# Patient Record
Sex: MALE | Race: WHITE | NOT HISPANIC OR LATINO | Employment: OTHER | URBAN - METROPOLITAN AREA
[De-identification: names, ages, dates, MRNs, and addresses within clinical notes are randomized per-mention and may not be internally consistent; named-entity substitution may affect disease eponyms.]

---

## 2018-12-24 RX ORDER — PANTOPRAZOLE SODIUM 40 MG/1
40 TABLET, DELAYED RELEASE ORAL
Qty: 90 TABLET | Refills: 0 | Status: SHIPPED | OUTPATIENT
Start: 2018-12-24 | End: 2019-03-25 | Stop reason: SDUPTHER

## 2018-12-24 RX ORDER — PANTOPRAZOLE SODIUM 40 MG/1
40 TABLET, DELAYED RELEASE ORAL
Qty: 90 TABLET | Refills: 0 | OUTPATIENT
Start: 2018-12-24

## 2019-01-07 ENCOUNTER — OFFICE VISIT (OUTPATIENT)
Dept: INTERNAL MEDICINE | Facility: CLINIC | Age: 74
End: 2019-01-07
Payer: COMMERCIAL

## 2019-01-07 VITALS
OXYGEN SATURATION: 92 % | HEIGHT: 72 IN | HEART RATE: 83 BPM | BODY MASS INDEX: 26.55 KG/M2 | TEMPERATURE: 97.7 F | RESPIRATION RATE: 17 BRPM | DIASTOLIC BLOOD PRESSURE: 70 MMHG | SYSTOLIC BLOOD PRESSURE: 120 MMHG | WEIGHT: 196 LBS

## 2019-01-07 DIAGNOSIS — R05.9 COUGH: ICD-10-CM

## 2019-01-07 DIAGNOSIS — J01.90 ACUTE SINUSITIS, RECURRENCE NOT SPECIFIED, UNSPECIFIED LOCATION: Primary | ICD-10-CM

## 2019-01-07 PROCEDURE — 99214 OFFICE O/P EST MOD 30 MIN: CPT | Performed by: NURSE PRACTITIONER

## 2019-01-07 RX ORDER — AMOXICILLIN 500 MG/1
500 TABLET, FILM COATED ORAL 3 TIMES DAILY
Qty: 21 TABLET | Refills: 0 | Status: SHIPPED | OUTPATIENT
Start: 2019-01-07 | End: 2019-01-14

## 2019-01-07 ASSESSMENT — ENCOUNTER SYMPTOMS
JOINT SWELLING: 0
TROUBLE SWALLOWING: 0
NECK STIFFNESS: 0
DIARRHEA: 0
SINUS PRESSURE: 1
VOICE CHANGE: 1
EYE ITCHING: 0
HEMATURIA: 0
BACK PAIN: 0
WHEEZING: 0
FLANK PAIN: 0
CONFUSION: 0
RHINORRHEA: 1
DYSURIA: 0
TREMORS: 0
SWOLLEN GLANDS: 0
BLOOD IN STOOL: 0
SEIZURES: 0
SINUS PAIN: 1
HEADACHES: 0
NUMBNESS: 0
WEAKNESS: 0
SORE THROAT: 1
ABDOMINAL PAIN: 0
DIFFICULTY URINATING: 0
CONSTIPATION: 0
VOMITING: 0
CHEST TIGHTNESS: 0
PALPITATIONS: 0
FREQUENCY: 0
CHILLS: 0
FATIGUE: 1
EYE REDNESS: 0
NAUSEA: 0
DIZZINESS: 0
COUGH: 1
NECK PAIN: 0
SHORTNESS OF BREATH: 0
FEVER: 0
EYE DISCHARGE: 0

## 2019-01-07 NOTE — PROGRESS NOTES
Daily Progress Note      Subjective      Patient ID: Quang Carrera is a 73 y.o. male who presents here for     URI    This is a new problem. Episode onset: one week ago. The problem has been gradually worsening. There has been no fever. Associated symptoms include congestion, coughing, rhinorrhea, sinus pain, sneezing and a sore throat (improving). Pertinent negatives include no abdominal pain, chest pain, diarrhea, dysuria, ear pain, headaches, joint pain, joint swelling, nausea, neck pain, plugged ear sensation, rash, swollen glands, vomiting or wheezing. He has tried nothing for the symptoms.         Review of Systems   Constitutional: Positive for fatigue. Negative for chills and fever.   HENT: Positive for congestion, postnasal drip, rhinorrhea, sinus pain, sinus pressure, sneezing, sore throat (improving) and voice change. Negative for ear pain, tinnitus and trouble swallowing.    Eyes: Negative for discharge, redness and itching.   Respiratory: Positive for cough. Negative for chest tightness, shortness of breath and wheezing.    Cardiovascular: Negative for chest pain, palpitations and leg swelling.   Gastrointestinal: Negative for abdominal pain, blood in stool, constipation, diarrhea, nausea and vomiting.   Endocrine: Negative for cold intolerance and heat intolerance.   Genitourinary: Negative for difficulty urinating, dysuria, flank pain, frequency, hematuria and urgency.   Musculoskeletal: Negative for back pain, joint pain, joint swelling, neck pain and neck stiffness.   Skin: Negative for rash.   Allergic/Immunologic: Negative for environmental allergies.   Neurological: Negative for dizziness, tremors, seizures, weakness, numbness and headaches.   Psychiatric/Behavioral: Negative for confusion.       Current Outpatient Prescriptions   Medication Sig Dispense Refill   • pantoprazole (PROTONIX) 40 mg EC tablet Take 1 tablet (40 mg total) by mouth once daily. 90 tablet 0   • amoxicillin (AMOXIL) 500 mg  tablet Take 1 tablet (500 mg total) by mouth 3 (three) times a day for 7 days. 21 tablet 0     No current facility-administered medications for this visit.      History reviewed. No pertinent past medical history.  History reviewed. No pertinent family history.  History reviewed. No pertinent surgical history.  Social History     Social History   • Marital status:      Spouse name: N/A   • Number of children: N/A   • Years of education: N/A     Occupational History   • Not on file.     Social History Main Topics   • Smoking status: Never Smoker   • Smokeless tobacco: Never Used   • Alcohol use Yes   • Drug use: Unknown   • Sexual activity: Not on file     Other Topics Concern   • Not on file     Social History Narrative   • No narrative on file     No Known Allergies    Objective   Vitals:    01/07/19 1229   BP: 120/70   Pulse: 83   Resp: 17   Temp: 36.5 °C (97.7 °F)   TempSrc: Oral   SpO2: 92%   Weight: 88.9 kg (196 lb)   Height: 1.829 m (6')     Body mass index is 26.58 kg/m².      Physical Exam   Constitutional: He is oriented to person, place, and time. He appears well-developed and well-nourished. He appears ill.   HENT:   Head: Normocephalic.   Right Ear: Tympanic membrane, external ear and ear canal normal.   Left Ear: Tympanic membrane, external ear and ear canal normal.   Nose: Rhinorrhea present. Right sinus exhibits frontal sinus tenderness. Left sinus exhibits maxillary sinus tenderness.   Mouth/Throat: Posterior oropharyngeal erythema present. No oropharyngeal exudate.   Eyes: Conjunctivae and EOM are normal. Pupils are equal, round, and reactive to light.   Neck: Normal range of motion. Neck supple.   Cardiovascular: Normal rate, regular rhythm and normal heart sounds.    Pulmonary/Chest: Effort normal and breath sounds normal.   Abdominal: Soft. Bowel sounds are normal.   Musculoskeletal: Normal range of motion.   Neurological: He is alert and oriented to person, place, and time.   Skin: Skin  is warm and dry.   Psychiatric: He has a normal mood and affect. His behavior is normal.       Assessment/Plan     Problem List Items Addressed This Visit     None      Visit Diagnoses     Acute sinusitis, recurrence not specified, unspecified location    -  Primary    Saline nasal spray  Warm salt water gargles  Push fluids  Call if no improvement      Relevant Medications    amoxicillin (AMOXIL) 500 mg tablet    Cough        Mucinex for your cough  Call if no improvement            MICHELE Flannery  1/7/2019

## 2019-01-07 NOTE — PATIENT INSTRUCTIONS
Mucinex for your cough  Saline nasal spray  Warm salt water gargles  Push fluids  Call if no improvement

## 2019-01-14 ENCOUNTER — OFFICE VISIT (OUTPATIENT)
Dept: INTERNAL MEDICINE | Facility: CLINIC | Age: 74
End: 2019-01-14
Payer: COMMERCIAL

## 2019-01-14 VITALS
RESPIRATION RATE: 18 BRPM | BODY MASS INDEX: 26.57 KG/M2 | SYSTOLIC BLOOD PRESSURE: 130 MMHG | HEART RATE: 95 BPM | HEIGHT: 72 IN | WEIGHT: 196.2 LBS | OXYGEN SATURATION: 96 % | DIASTOLIC BLOOD PRESSURE: 80 MMHG | TEMPERATURE: 98 F

## 2019-01-14 DIAGNOSIS — J01.90 ACUTE SINUSITIS, RECURRENCE NOT SPECIFIED, UNSPECIFIED LOCATION: Primary | ICD-10-CM

## 2019-01-14 PROCEDURE — 99213 OFFICE O/P EST LOW 20 MIN: CPT | Performed by: INTERNAL MEDICINE

## 2019-01-14 ASSESSMENT — ENCOUNTER SYMPTOMS
ABDOMINAL PAIN: 0
CHILLS: 0
HEADACHES: 0
FATIGUE: 0
CONFUSION: 0
SORE THROAT: 0
PALPITATIONS: 0
HEMATURIA: 0
COUGH: 0
EYE PAIN: 0
DYSURIA: 0
BACK PAIN: 0
VOMITING: 0
NUMBNESS: 0
BLOOD IN STOOL: 0
DIZZINESS: 0
DIARRHEA: 0
NAUSEA: 0
BRUISES/BLEEDS EASILY: 0
SHORTNESS OF BREATH: 0
FEVER: 0

## 2019-01-14 NOTE — ASSESSMENT & PLAN NOTE
Much improved  Advised him to use saline nasal spray daily and if symptoms persist to try Flonase nasal spray  He will continue the Mucinex daily for 5 more days follow-up if symptoms recur or are persistent

## 2019-01-14 NOTE — PROGRESS NOTES
Subjective      Patient ID: Quang Carrera is a 73 y.o. male     HPI     Here for follow-up on the acute visit for sinusitis and cough last week  He was given amoxicillin that he completed  He feels a lot better  Has mild postnasal drip and sinus congestion but overall feels much improved    Past Medical History:   Diagnosis Date   • Colon polyps    • Hemangioma of liver    • Hypertension    • Inguinal hernia    • Lipid disorder        Past Surgical History:   Procedure Laterality Date   • EYE SURGERY         No family history on file.    Social History     Social History   • Marital status:      Spouse name: N/A   • Number of children: N/A   • Years of education: N/A     Occupational History   • Not on file.     Social History Main Topics   • Smoking status: Never Smoker   • Smokeless tobacco: Never Used   • Alcohol use Yes   • Drug use: Unknown   • Sexual activity: Not on file     Other Topics Concern   • Not on file     Social History Narrative   • No narrative on file       No Known Allergies    Current Outpatient Prescriptions   Medication Sig Dispense Refill   • pantoprazole (PROTONIX) 40 mg EC tablet Take 1 tablet (40 mg total) by mouth once daily. 90 tablet 0   • amoxicillin (AMOXIL) 500 mg tablet Take 1 tablet (500 mg total) by mouth 3 (three) times a day for 7 days. 21 tablet 0     No current facility-administered medications for this visit.        /80   Pulse 95   Temp 36.7 °C (98 °F) (Oral)   Resp 18   Ht 1.829 m (6')   Wt 89 kg (196 lb 3.2 oz)   SpO2 96%   BMI 26.61 kg/m²       The following have been reviewed and updated as appropriate in this visit:       Review of Systems   Constitutional: Negative for chills, fatigue and fever.   HENT: Positive for postnasal drip. Negative for ear pain and sore throat.    Eyes: Negative for pain and visual disturbance.   Respiratory: Negative for cough and shortness of breath.    Cardiovascular: Negative for chest pain, palpitations and leg  swelling.   Gastrointestinal: Negative for abdominal pain, blood in stool, diarrhea, nausea and vomiting.   Genitourinary: Negative for dysuria and hematuria.   Musculoskeletal: Negative for back pain.   Skin: Negative for rash.   Allergic/Immunologic: Negative for environmental allergies.   Neurological: Negative for dizziness, numbness and headaches.   Hematological: Does not bruise/bleed easily.   Psychiatric/Behavioral: Negative for confusion.       Objective       Physical Exam   Constitutional: He is oriented to person, place, and time. He appears well-developed and well-nourished.   HENT:   Head: Normocephalic and atraumatic.   Right Ear: External ear normal.   Left Ear: External ear normal.   Nose: Nose normal.   Nasal turbinates mildly congested   Eyes: Conjunctivae and EOM are normal. Pupils are equal, round, and reactive to light.   Neck: Normal range of motion. Neck supple. No thyromegaly present.   Cardiovascular: Normal rate, regular rhythm, normal heart sounds and intact distal pulses.    Pulmonary/Chest: Effort normal and breath sounds normal. No respiratory distress. He exhibits no tenderness.   Abdominal: Soft. Bowel sounds are normal. There is no tenderness. There is no guarding.   Musculoskeletal: Normal range of motion. He exhibits no tenderness.   Lymphadenopathy:     He has no cervical adenopathy.   Neurological: He is alert and oriented to person, place, and time. No cranial nerve deficit or sensory deficit.   Skin: Skin is warm and dry. No rash noted.   Psychiatric: He has a normal mood and affect. His behavior is normal.   Nursing note and vitals reviewed.      Assessment/Plan     Acute sinusitis  Much improved  Advised him to use saline nasal spray daily and if symptoms persist to try Flonase nasal spray  He will continue the Mucinex daily for 5 more days follow-up if symptoms recur or are persistent        Mary Wise MD  1/14/2019  8:42 AM

## 2019-01-28 ENCOUNTER — OFFICE VISIT (OUTPATIENT)
Dept: INTERNAL MEDICINE | Facility: CLINIC | Age: 74
End: 2019-01-28
Payer: COMMERCIAL

## 2019-01-28 VITALS
BODY MASS INDEX: 26.68 KG/M2 | TEMPERATURE: 97.7 F | RESPIRATION RATE: 18 BRPM | OXYGEN SATURATION: 97 % | DIASTOLIC BLOOD PRESSURE: 70 MMHG | HEIGHT: 72 IN | SYSTOLIC BLOOD PRESSURE: 130 MMHG | WEIGHT: 197 LBS | HEART RATE: 90 BPM

## 2019-01-28 DIAGNOSIS — Z87.19 HISTORY OF ESOPHAGITIS: ICD-10-CM

## 2019-01-28 DIAGNOSIS — Z00.00 PHYSICAL EXAM: Primary | ICD-10-CM

## 2019-01-28 DIAGNOSIS — I10 ESSENTIAL HYPERTENSION: ICD-10-CM

## 2019-01-28 DIAGNOSIS — E78.5 HYPERLIPIDEMIA, UNSPECIFIED HYPERLIPIDEMIA TYPE: ICD-10-CM

## 2019-01-28 PROBLEM — J01.90 ACUTE SINUSITIS: Status: RESOLVED | Noted: 2019-01-14 | Resolved: 2019-01-28

## 2019-01-28 PROCEDURE — 99397 PER PM REEVAL EST PAT 65+ YR: CPT | Performed by: INTERNAL MEDICINE

## 2019-01-28 ASSESSMENT — ENCOUNTER SYMPTOMS
DIZZINESS: 0
COUGH: 0
CONFUSION: 0
PALPITATIONS: 0
SORE THROAT: 0
FEVER: 0
CHILLS: 0
VOMITING: 0
EYE PAIN: 0
HEADACHES: 0
NAUSEA: 0
FATIGUE: 0
HEMATURIA: 0
ABDOMINAL PAIN: 0
DIARRHEA: 0
SHORTNESS OF BREATH: 0
BACK PAIN: 0
NUMBNESS: 0
DYSURIA: 0
BRUISES/BLEEDS EASILY: 0
BLOOD IN STOOL: 0

## 2019-01-28 NOTE — PATIENT INSTRUCTIONS
PLAN    Get fasting blood work  Drink lots of fluids  Eye exam  Get Shingrix vaccine  See me in a year

## 2019-01-28 NOTE — PROGRESS NOTES
Subjective      Patient ID: Quang Carrera is a 73 y.o. male       HPI     Here for physical exam and review of chronic conditions  His colonoscopy was in 2016  He takes the pantoprazole daily for esophagitis in the past  He declined to see the urologist and also would not like to test the PSA  He also declined vaccines    Past Medical History:   Diagnosis Date   • Colon polyps    • Hemangioma of liver    • History of Brito's esophagus    • Hypertension    • Inguinal hernia    • Lipid disorder        Past Surgical History:   Procedure Laterality Date   • COLONOSCOPY      2016   • EYE SURGERY         Family History   Problem Relation Age of Onset   • Brito's esophagus Father        Social History     Social History   • Marital status:      Spouse name: N/A   • Number of children: N/A   • Years of education: N/A     Occupational History   • Not on file.     Social History Main Topics   • Smoking status: Never Smoker   • Smokeless tobacco: Never Used   • Alcohol use Yes   • Drug use: Unknown   • Sexual activity: Not on file     Other Topics Concern   • Not on file     Social History Narrative   • No narrative on file       No Known Allergies    Current Outpatient Prescriptions   Medication Sig Dispense Refill   • pantoprazole (PROTONIX) 40 mg EC tablet Take 1 tablet (40 mg total) by mouth once daily. 90 tablet 0     No current facility-administered medications for this visit.        /70   Pulse 90   Temp 36.5 °C (97.7 °F) (Oral)   Resp 18   Ht 1.829 m (6')   Wt 89.4 kg (197 lb)   SpO2 97%   BMI 26.72 kg/m²       The following have been reviewed and updated as appropriate in this visit:  Allergies  Meds  Problems       Review of Systems   Constitutional: Negative for chills, fatigue and fever.   HENT: Negative for ear pain and sore throat.    Eyes: Negative for pain and visual disturbance.   Respiratory: Negative for cough and shortness of breath.    Cardiovascular: Negative for chest pain,  palpitations and leg swelling.   Gastrointestinal: Negative for abdominal pain, blood in stool, diarrhea, nausea and vomiting.   Genitourinary: Negative for dysuria and hematuria.   Musculoskeletal: Negative for back pain.   Skin: Negative for rash.   Allergic/Immunologic: Negative for environmental allergies.   Neurological: Negative for dizziness, numbness and headaches.   Hematological: Does not bruise/bleed easily.   Psychiatric/Behavioral: Negative for confusion.       Objective       Physical Exam   Constitutional: He is oriented to person, place, and time. He appears well-developed and well-nourished.   HENT:   Head: Normocephalic and atraumatic.   Right Ear: External ear normal.   Left Ear: External ear normal.   Nose: Nose normal.   Mouth/Throat: Oropharynx is clear and moist.   Eyes: Conjunctivae and EOM are normal. Pupils are equal, round, and reactive to light.   Neck: Normal range of motion. Neck supple. No thyromegaly present.   Cardiovascular: Normal rate, regular rhythm, normal heart sounds and intact distal pulses.    Pulmonary/Chest: Effort normal and breath sounds normal. No respiratory distress. He exhibits no tenderness.   Abdominal: Soft. Bowel sounds are normal. There is no tenderness. There is no guarding.   Groin hernia,non tender   Musculoskeletal: Normal range of motion. He exhibits no tenderness.   Lymphadenopathy:     He has no cervical adenopathy.   Neurological: He is alert and oriented to person, place, and time. No cranial nerve deficit or sensory deficit.   Skin: Skin is warm and dry. No rash noted.   Psychiatric: He has a normal mood and affect. His behavior is normal.   Nursing note and vitals reviewed.      Assessment/Plan     Physical exam  Physical exam done  Lab slip given for fasting blood work  Colonoscopy was in 2016 and will be due in 2021  He declined to see the urologist and also declined PSA  Declined all vaccines  Eye and dental exams advised  Discussed healthy diet  and regular exercise  Sun protection measures reviewed      Hyperlipidemia  Has history of mild elevation of lipids  Discussed diet and lifestyle changes  Check fasting blood work for lipids    HTN (hypertension)  His blood pressure has been stable  Continue to monitor    History of esophagitis  He will continue the pantoprazole 40 mg p.o. daily  He has history of esophagitis and questionable Brito's in the past   last endoscopy was in 2016 and was normal        Mary Wise MD  1/28/2019  8:28 PM

## 2019-01-29 NOTE — ASSESSMENT & PLAN NOTE
Has history of mild elevation of lipids  Discussed diet and lifestyle changes  Check fasting blood work for lipids

## 2019-01-29 NOTE — ASSESSMENT & PLAN NOTE
He will continue the pantoprazole 40 mg p.o. daily  He has history of esophagitis and questionable Brito's in the past   last endoscopy was in 2016 and was normal

## 2019-01-29 NOTE — ASSESSMENT & PLAN NOTE
Physical exam done  Lab slip given for fasting blood work  Colonoscopy was in 2016 and will be due in 2021  He declined to see the urologist and also declined PSA  Declined all vaccines  Eye and dental exams advised  Discussed healthy diet and regular exercise  Sun protection measures reviewed

## 2019-01-31 ENCOUNTER — TELEPHONE (OUTPATIENT)
Dept: INTERNAL MEDICINE | Facility: CLINIC | Age: 74
End: 2019-01-31

## 2019-01-31 LAB
ALBUMIN SERPL-MCNC: 4.4 G/DL (ref 3.5–4.8)
ALBUMIN/GLOB SERPL: 1.4 {RATIO} (ref 1.2–2.2)
ALP SERPL-CCNC: 81 IU/L (ref 39–117)
ALT SERPL-CCNC: 22 IU/L (ref 0–44)
AST SERPL-CCNC: 22 IU/L (ref 0–40)
BASOPHILS # BLD AUTO: 0.1 X10E3/UL (ref 0–0.2)
BASOPHILS NFR BLD AUTO: 1 %
BILIRUB SERPL-MCNC: 0.4 MG/DL (ref 0–1.2)
BUN SERPL-MCNC: 17 MG/DL (ref 8–27)
BUN/CREAT SERPL: 14 (ref 10–24)
CALCIUM SERPL-MCNC: 9.8 MG/DL (ref 8.6–10.2)
CHLORIDE SERPL-SCNC: 98 MMOL/L (ref 96–106)
CHOLEST SERPL-MCNC: 219 MG/DL (ref 100–199)
CO2 SERPL-SCNC: 25 MMOL/L (ref 20–29)
CREAT SERPL-MCNC: 1.24 MG/DL (ref 0.76–1.27)
EOSINOPHIL # BLD AUTO: 0.2 X10E3/UL (ref 0–0.4)
EOSINOPHIL NFR BLD AUTO: 3 %
ERYTHROCYTE [DISTWIDTH] IN BLOOD BY AUTOMATED COUNT: 13.8 % (ref 12.3–15.4)
GLOBULIN SER CALC-MCNC: 3.1 G/DL (ref 1.5–4.5)
GLUCOSE SERPL-MCNC: 96 MG/DL (ref 65–99)
HBA1C MFR BLD: 5.6 % (ref 4.8–5.6)
HCT VFR BLD AUTO: 45.5 % (ref 37.5–51)
HDLC SERPL-MCNC: 39 MG/DL
HGB BLD-MCNC: 14.8 G/DL (ref 13–17.7)
IMM GRANULOCYTES # BLD AUTO: 0 X10E3/UL (ref 0–0.1)
IMM GRANULOCYTES NFR BLD AUTO: 0 %
LAB CORP EGFR IF AFRICN AM: 67 ML/MIN/1.73
LAB CORP EGFR IF NONAFRICN AM: 58 ML/MIN/1.73
LDLC SERPL CALC-MCNC: 113 MG/DL (ref 0–99)
LYMPHOCYTES # BLD AUTO: 2.7 X10E3/UL (ref 0.7–3.1)
LYMPHOCYTES NFR BLD AUTO: 37 %
MCH RBC QN AUTO: 28.9 PG (ref 26.6–33)
MCHC RBC AUTO-ENTMCNC: 32.5 G/DL (ref 31.5–35.7)
MCV RBC AUTO: 89 FL (ref 79–97)
MONOCYTES # BLD AUTO: 0.7 X10E3/UL (ref 0.1–0.9)
MONOCYTES NFR BLD AUTO: 10 %
NEUTROPHILS # BLD AUTO: 3.7 X10E3/UL (ref 1.4–7)
NEUTROPHILS NFR BLD AUTO: 49 %
PLATELET # BLD AUTO: 353 X10E3/UL (ref 150–379)
POTASSIUM SERPL-SCNC: 5 MMOL/L (ref 3.5–5.2)
PROT SERPL-MCNC: 7.5 G/DL (ref 6–8.5)
RBC # BLD AUTO: 5.12 X10E6/UL (ref 4.14–5.8)
SODIUM SERPL-SCNC: 141 MMOL/L (ref 134–144)
TRIGL SERPL-MCNC: 333 MG/DL (ref 0–149)
TSH SERPL DL<=0.005 MIU/L-ACNC: 1.29 UIU/ML (ref 0.45–4.5)
VLDLC SERPL CALC-MCNC: 67 MG/DL (ref 5–40)
WBC # BLD AUTO: 7.4 X10E3/UL (ref 3.4–10.8)

## 2019-01-31 NOTE — LETTER
January 31, 2019     Quang Deandre  [address] [place] [address]      Dear Mr. Carrera:    Below are the results from your recent visit:    Resulted Orders   CBC and differential   Result Value Ref Range    WBC 7.4 3.4 - 10.8 x10E3/uL    RBC 5.12 4.14 - 5.80 x10E6/uL    Hemoglobin 14.8 13.0 - 17.7 g/dL    Hematocrit 45.5 37.5 - 51.0 %    MCV 89 79 - 97 fL    MCH 28.9 26.6 - 33.0 pg    MCHC 32.5 31.5 - 35.7 g/dL    RDW 13.8 12.3 - 15.4 %    Platelets 353 150 - 379 x10E3/uL    Neutrophils 49 Not Estab. %    Lymphs 37 Not Estab. %    Monocytes 10 Not Estab. %    Eos 3 Not Estab. %    Basos 1 Not Estab. %    Neutrophils (Absolute) 3.7 1.4 - 7.0 x10E3/uL    Lymphs (Absolute) 2.7 0.7 - 3.1 x10E3/uL    Monocytes(Absolute) 0.7 0.1 - 0.9 x10E3/uL    Eos (Absolute) 0.2 0.0 - 0.4 x10E3/uL    Baso (Absolute) 0.1 0.0 - 0.2 x10E3/uL    Immature Granulocytes 0 Not Estab. %    Immature Grans (Abs) 0.0 0.0 - 0.1 x10E3/uL    Narrative    Performed at:  [address] [place] [address]  [id number]  : Araceli B Reyes MD, Phone:  [phone]   Comprehensive metabolic panel   Result Value Ref Range    Glucose, Serum 96 65 - 99 mg/dL    BUN 17 8 - 27 mg/dL    Creatinine, Serum 1.24 0.76 - 1.27 mg/dL    eGFR If NonAfricn Am 58 (L) >59 mL/min/1.73    eGFR If Africn Am 67 >59 mL/min/1.73    BUN/Creatinine Ratio 14 10 - 24    Sodium, Serum 141 134 - 144 mmol/L    Potassium, Serum 5.0 3.5 - 5.2 mmol/L    Chloride, Serum 98 96 - 106 mmol/L    Carbon Dioxide, Total 25 20 - 29 mmol/L    Calcium, Serum 9.8 8.6 - 10.2 mg/dL    Protein, Total, Serum 7.5 6.0 - 8.5 g/dL    Albumin, Serum 4.4 3.5 - 4.8 g/dL    Globulin, Total 3.1 1.5 - 4.5 g/dL    A/G Ratio 1.4 1.2 - 2.2    Bilirubin, Total 0.4 0.0 - 1.2 mg/dL    Alkaline Phosphatase, S 81 39 - 117 IU/L    AST (SGOT) 22 0 - 40 IU/L    ALT (SGPT) 22 0 - 44 IU/L    Narrative    Performed at:  [address]  [id number]  Lab  Director: Araceli B Reyes MD, Phone:  [phone]   Hemoglobin A1c   Result Value Ref Range    Hemoglobin A1c 5.6 4.8 - 5.6 %      Comment:               Prediabetes: 5.7 - 6.4           Diabetes: >6.4           Glycemic control for adults with diabetes: <7.0      Narrative    Performed at:  [address] [place] [address]  [id number]  : Araceli B Reyes MD, Phone:  [phone]   Lipid panel   Result Value Ref Range    Cholesterol, Total 219 (H) 100 - 199 mg/dL    Triglycerides 333 (H) 0 - 149 mg/dL    HDL Cholesterol 39 (L) >39 mg/dL    VLDL Cholesterol Christian 67 (H) 5 - 40 mg/dL    LDL Cholesterol Calc 113 (H) 0 - 99 mg/dL    Narrative    Performed at:  [address] [place] [address]  [id number]  : Araceli B Reyes MD, Phone:  [phone]   TSH   Result Value Ref Range    TSH 1.290 0.450 - 4.500 uIU/mL    Narrative    Performed at:  [address] [place] [address]  [id number]  : Araceli B Reyes MD, Phone:  [phone]     As discussed,this is a copy of the test results  Watch the diet        Sincerely,          Mary Wise MD

## 2019-03-05 ENCOUNTER — OFFICE VISIT (OUTPATIENT)
Dept: INTERNAL MEDICINE | Facility: CLINIC | Age: 74
End: 2019-03-05
Payer: COMMERCIAL

## 2019-03-05 VITALS
HEIGHT: 72 IN | DIASTOLIC BLOOD PRESSURE: 76 MMHG | RESPIRATION RATE: 17 BRPM | WEIGHT: 198.8 LBS | HEART RATE: 95 BPM | SYSTOLIC BLOOD PRESSURE: 140 MMHG | BODY MASS INDEX: 26.93 KG/M2 | TEMPERATURE: 97.7 F | OXYGEN SATURATION: 94 %

## 2019-03-05 DIAGNOSIS — I10 ESSENTIAL HYPERTENSION: ICD-10-CM

## 2019-03-05 DIAGNOSIS — R21 RASH: Primary | ICD-10-CM

## 2019-03-05 PROCEDURE — 99213 OFFICE O/P EST LOW 20 MIN: CPT | Performed by: NURSE PRACTITIONER

## 2019-03-05 ASSESSMENT — ENCOUNTER SYMPTOMS
CONFUSION: 0
EYE REDNESS: 0
BACK PAIN: 0
CONSTIPATION: 0
COUGH: 0
SHORTNESS OF BREATH: 0
HEADACHES: 0
SORE THROAT: 0
DIZZINESS: 0
SEIZURES: 0
SINUS PAIN: 0
DIFFICULTY URINATING: 0
NAUSEA: 0
PALPITATIONS: 0
TREMORS: 0
ABDOMINAL PAIN: 0
EYE DISCHARGE: 0
FLANK PAIN: 0
EYE PAIN: 0
BLOOD IN STOOL: 0
FATIGUE: 0
EYE ITCHING: 0
NECK STIFFNESS: 0
VOMITING: 0
RHINORRHEA: 0
NECK PAIN: 0
SINUS PRESSURE: 0
WEAKNESS: 0
FREQUENCY: 0
DYSURIA: 0
JOINT SWELLING: 0
DIARRHEA: 0
ANOREXIA: 0
FEVER: 0
NAIL CHANGES: 0
HEMATURIA: 0
WHEEZING: 0
CHEST TIGHTNESS: 0
NUMBNESS: 0

## 2019-03-05 NOTE — PROGRESS NOTES
Daily Progress Note      Subjective      Patient ID: Quang Carrera is a 73 y.o. male who presents here for     Rash   This is a new problem. Episode onset: 3 days ago when shoveling snow. The problem is unchanged. Location: R hand palm. The rash is characterized by redness and itchiness. Pertinent negatives include no anorexia, congestion, cough, diarrhea, eye pain, facial edema, fatigue, fever, joint pain, nail changes, rhinorrhea, shortness of breath, sore throat or vomiting. Past treatments include nothing.         Review of Systems   Constitutional: Negative for fatigue and fever.   HENT: Negative for congestion, ear pain, postnasal drip, rhinorrhea, sinus pain, sinus pressure, sneezing, sore throat and tinnitus.    Eyes: Negative for pain, discharge, redness and itching.   Respiratory: Negative for cough, chest tightness, shortness of breath and wheezing.    Cardiovascular: Negative for chest pain, palpitations and leg swelling.   Gastrointestinal: Negative for abdominal pain, anorexia, blood in stool, constipation, diarrhea, nausea and vomiting.   Endocrine: Negative for cold intolerance and heat intolerance.   Genitourinary: Negative for difficulty urinating, dysuria, flank pain, frequency, hematuria and urgency.   Musculoskeletal: Negative for back pain, joint pain, joint swelling, neck pain and neck stiffness.   Skin: Positive for rash. Negative for nail changes.   Allergic/Immunologic: Negative for environmental allergies.   Neurological: Negative for dizziness, tremors, seizures, weakness, numbness and headaches.   Psychiatric/Behavioral: Negative for confusion.       Current Outpatient Prescriptions   Medication Sig Dispense Refill   • pantoprazole (PROTONIX) 40 mg EC tablet Take 1 tablet (40 mg total) by mouth once daily. 90 tablet 0     No current facility-administered medications for this visit.      Past Medical History:   Diagnosis Date   • Colon polyps    • Hemangioma of liver    • History of  Brito's esophagus    • Hypertension    • Inguinal hernia    • Lipid disorder      Family History   Problem Relation Age of Onset   • Brito's esophagus Father      Past Surgical History:   Procedure Laterality Date   • COLONOSCOPY      2016   • EYE SURGERY       Social History     Social History   • Marital status:      Spouse name: N/A   • Number of children: N/A   • Years of education: N/A     Occupational History   • Not on file.     Social History Main Topics   • Smoking status: Never Smoker   • Smokeless tobacco: Never Used   • Alcohol use Yes   • Drug use: Unknown   • Sexual activity: Not on file     Other Topics Concern   • Not on file     Social History Narrative   • No narrative on file     No Known Allergies    Objective   Vitals:    03/05/19 0930   BP: 140/76   Pulse: 95   Resp: 17   Temp: 36.5 °C (97.7 °F)   TempSrc: Oral   SpO2: 94%   Weight: 90.2 kg (198 lb 12.8 oz)   Height: 1.829 m (6')     Body mass index is 26.96 kg/m².      Physical Exam   Constitutional: He is oriented to person, place, and time. He appears well-developed and well-nourished.   HENT:   Head: Normocephalic.   Right Ear: External ear normal.   Left Ear: External ear normal.   Nose: Nose normal.   Mouth/Throat: Oropharynx is clear and moist.   Eyes: Conjunctivae and EOM are normal. Pupils are equal, round, and reactive to light.   Neck: Normal range of motion. Neck supple.   Cardiovascular: Normal rate, regular rhythm and normal heart sounds.    Pulmonary/Chest: Effort normal and breath sounds normal.   Abdominal: Soft. Bowel sounds are normal.   Musculoskeletal: Normal range of motion.   Neurological: He is alert and oriented to person, place, and time.   Skin: Skin is warm and dry.   R hand palm- 2-3 cm round redness, no tenderness, skin intact.   Psychiatric: He has a normal mood and affect. His behavior is normal.       Assessment/Plan     Problem List Items Addressed This Visit        Other    HTN (hypertension)      Controlled with diet and exercise.            Other Visit Diagnoses     Rash    -  Primary    Appears related to use of show shovel for 3-4 hours  Improving  Advised to apply warm compresses  Call if no improvement            MICHELE Flannery  3/5/2019

## 2019-03-19 ENCOUNTER — OFFICE VISIT (OUTPATIENT)
Dept: INTERNAL MEDICINE | Facility: CLINIC | Age: 74
End: 2019-03-19
Payer: COMMERCIAL

## 2019-03-19 VITALS
HEIGHT: 72 IN | TEMPERATURE: 97.8 F | BODY MASS INDEX: 26.55 KG/M2 | RESPIRATION RATE: 18 BRPM | OXYGEN SATURATION: 98 % | DIASTOLIC BLOOD PRESSURE: 60 MMHG | SYSTOLIC BLOOD PRESSURE: 120 MMHG | WEIGHT: 196 LBS | HEART RATE: 85 BPM

## 2019-03-19 DIAGNOSIS — K21.9 GASTROESOPHAGEAL REFLUX DISEASE, ESOPHAGITIS PRESENCE NOT SPECIFIED: ICD-10-CM

## 2019-03-19 DIAGNOSIS — J22 LRTI (LOWER RESPIRATORY TRACT INFECTION): ICD-10-CM

## 2019-03-19 PROBLEM — Z00.00 PHYSICAL EXAM: Status: RESOLVED | Noted: 2019-01-28 | Resolved: 2019-03-19

## 2019-03-19 PROBLEM — R05.9 COUGH: Status: RESOLVED | Noted: 2019-03-19 | Resolved: 2019-03-19

## 2019-03-19 PROBLEM — Z87.19 HISTORY OF ESOPHAGITIS: Status: RESOLVED | Noted: 2019-01-28 | Resolved: 2019-03-19

## 2019-03-19 PROBLEM — R05.9 COUGH: Status: ACTIVE | Noted: 2019-03-19

## 2019-03-19 PROCEDURE — 99214 OFFICE O/P EST MOD 30 MIN: CPT | Performed by: INTERNAL MEDICINE

## 2019-03-19 RX ORDER — ALBUTEROL SULFATE 90 UG/1
2 INHALANT RESPIRATORY (INHALATION) EVERY 6 HOURS PRN
Qty: 1 INHALER | Refills: 1 | Status: SHIPPED | OUTPATIENT
Start: 2019-03-19 | End: 2020-08-25

## 2019-03-19 RX ORDER — BENZONATATE 100 MG/1
100 CAPSULE ORAL 2 TIMES DAILY PRN
Qty: 20 CAPSULE | Refills: 0 | Status: SHIPPED | OUTPATIENT
Start: 2019-03-19 | End: 2019-03-29

## 2019-03-19 RX ORDER — AZITHROMYCIN 250 MG/1
250 TABLET, FILM COATED ORAL DAILY
Qty: 6 TABLET | Refills: 0 | Status: SHIPPED | OUTPATIENT
Start: 2019-03-19 | End: 2019-03-24

## 2019-03-19 ASSESSMENT — ENCOUNTER SYMPTOMS
BRUISES/BLEEDS EASILY: 0
BLOOD IN STOOL: 0
HEADACHES: 0
COUGH: 1
WHEEZING: 1
SORE THROAT: 0
EYE PAIN: 0
CHILLS: 0
BACK PAIN: 0
CONFUSION: 0
PALPITATIONS: 0
NUMBNESS: 0
NAUSEA: 0
DYSURIA: 0
HEMATURIA: 0
DIARRHEA: 0
DIZZINESS: 0
ABDOMINAL PAIN: 0
SHORTNESS OF BREATH: 1
FEVER: 0
FATIGUE: 0
VOMITING: 0

## 2019-03-19 NOTE — PROGRESS NOTES
Subjective      Patient ID: Quang Carrera is a 73 y.o. male     Cough   This is a new problem. The current episode started in the past 7 days. The problem has been gradually worsening. The problem occurs every few minutes. The cough is non-productive. Associated symptoms include shortness of breath and wheezing. Pertinent negatives include no chest pain, chills, ear pain, fever, headaches, rash or sore throat. The symptoms are aggravated by lying down. He has tried OTC cough suppressant for the symptoms. The treatment provided no relief. His past medical history is significant for environmental allergies.            Past Medical History:   Diagnosis Date   • Colon polyps    • Hemangioma of liver    • History of Brito's esophagus    • Hypertension    • Inguinal hernia    • Lipid disorder        Past Surgical History:   Procedure Laterality Date   • COLONOSCOPY      2016   • EYE SURGERY         Family History   Problem Relation Age of Onset   • Brito's esophagus Father        Social History     Social History   • Marital status:      Spouse name: N/A   • Number of children: N/A   • Years of education: N/A     Occupational History   • Not on file.     Social History Main Topics   • Smoking status: Never Smoker   • Smokeless tobacco: Never Used   • Alcohol use Yes   • Drug use: Unknown   • Sexual activity: Not on file     Other Topics Concern   • Not on file     Social History Narrative   • No narrative on file       No Known Allergies    Current Outpatient Prescriptions   Medication Sig Dispense Refill   • albuterol HFA (VENTOLIN HFA) 90 mcg/actuation inhaler Inhale 2 puffs every 6 (six) hours as needed for wheezing. 1 Inhaler 1   • azithromycin (ZITHROMAX) 250 mg tablet Take 1 tablet (250 mg total) by mouth daily for 5 days. Take 2 tablets the first day, then 1 tablet daily for 4 days. 6 tablet 0   • benzonatate (TESSALON PERLES) 100 mg capsule Take 1 capsule (100 mg total) by mouth 2 (two) times a day as  needed for cough for up to 10 days. 20 capsule 0   • pantoprazole (PROTONIX) 40 mg EC tablet Take 1 tablet (40 mg total) by mouth once daily. 90 tablet 0     No current facility-administered medications for this visit.        /60   Pulse 85   Temp 36.6 °C (97.8 °F) (Oral)   Resp 18   Ht 1.829 m (6')   Wt 88.9 kg (196 lb)   SpO2 98%   BMI 26.58 kg/m²       The following have been reviewed and updated as appropriate in this visit:  Allergies  Meds  Problems       Review of Systems   Constitutional: Negative for chills, fatigue and fever.   HENT: Negative for ear pain and sore throat.    Eyes: Negative for pain and visual disturbance.   Respiratory: Positive for cough, shortness of breath and wheezing.    Cardiovascular: Negative for chest pain, palpitations and leg swelling.   Gastrointestinal: Negative for abdominal pain, blood in stool, diarrhea, nausea and vomiting.   Genitourinary: Negative for dysuria and hematuria.   Musculoskeletal: Negative for back pain.   Skin: Negative for rash.   Allergic/Immunologic: Positive for environmental allergies.   Neurological: Negative for dizziness, numbness and headaches.   Hematological: Does not bruise/bleed easily.   Psychiatric/Behavioral: Negative for confusion.       Objective       Physical Exam   Constitutional: He is oriented to person, place, and time. He appears well-developed and well-nourished.   HENT:   Head: Normocephalic and atraumatic.   Right Ear: External ear normal.   Left Ear: External ear normal.   Nose: Nose normal.   Mouth/Throat: Oropharynx is clear and moist.   Eyes: Conjunctivae and EOM are normal. Pupils are equal, round, and reactive to light.   Neck: Normal range of motion. Neck supple. No thyromegaly present.   Cardiovascular: Normal rate, regular rhythm, normal heart sounds and intact distal pulses.    Pulmonary/Chest: Effort normal. No respiratory distress. He exhibits no tenderness.   Scattered anterior wheeze   Abdominal:  Soft. Bowel sounds are normal. There is no tenderness. There is no guarding.   Musculoskeletal: Normal range of motion. He exhibits no tenderness.   Lymphadenopathy:     He has no cervical adenopathy.   Neurological: He is alert and oriented to person, place, and time. No cranial nerve deficit or sensory deficit.   Skin: Skin is warm and dry. No rash noted.   Psychiatric: He has a normal mood and affect. His behavior is normal.   Nursing note and vitals reviewed.      Assessment/Plan     LRTI (lower respiratory tract infection)  Worsening and persisting symptoms  Start benzonatate Perles 100 mg p.o. 3 times daily for 7 days  Start albuterol inhaler 2 puffs every 6 hours as needed  Start azithromycin with food for 5 days  Rest and drink lots of fluids  He will call me with an update at the end of the week    Gastroesophageal reflux disease  fair control  continue the Protonix 40 mg p.o. daily        Mary Wise MD  3/19/2019  9:05 PM

## 2019-03-20 NOTE — ASSESSMENT & PLAN NOTE
Worsening and persisting symptoms  Start benzonatate Perles 100 mg p.o. 3 times daily for 7 days  Start albuterol inhaler 2 puffs every 6 hours as needed  Start azithromycin with food for 5 days  Rest and drink lots of fluids  He will call me with an update at the end of the week

## 2019-03-21 ENCOUNTER — TELEPHONE (OUTPATIENT)
Dept: INTERNAL MEDICINE | Facility: CLINIC | Age: 74
End: 2019-03-21

## 2019-03-21 NOTE — TELEPHONE ENCOUNTER
Spoke to patient and discussed that he will start the azithromycin with food he will continue the benzonatate Perles and use the albuterol 3 times daily  He was advised to call me with an update on Monday

## 2019-03-21 NOTE — TELEPHONE ENCOUNTER
Patient is stating the benzonatate is not working and he still feels the same.He would like a phone call when you get a chance at [phone].

## 2019-03-25 ENCOUNTER — TELEPHONE (OUTPATIENT)
Dept: INTERNAL MEDICINE | Facility: CLINIC | Age: 74
End: 2019-03-25

## 2019-03-25 RX ORDER — PANTOPRAZOLE SODIUM 40 MG/1
40 TABLET, DELAYED RELEASE ORAL
Qty: 90 TABLET | Refills: 0 | Status: SHIPPED | OUTPATIENT
Start: 2019-03-25 | End: 2019-06-20 | Stop reason: SDUPTHER

## 2019-03-25 NOTE — TELEPHONE ENCOUNTER
Patient is saying his cough has cleared up. Does he still need to take the medication or not? Patient can be reached at [phone].

## 2019-03-25 NOTE — TELEPHONE ENCOUNTER
Spoke with the patient and he told me that he is feeling a lot better and is cough is almost cleared up  Advised him to complete the 5 days of Z-Mikhail and to do 7 days of benzonatate and the albuterol inhaler

## 2020-03-13 RX ORDER — PANTOPRAZOLE SODIUM 40 MG/1
TABLET, DELAYED RELEASE ORAL
Qty: 90 TABLET | Refills: 0 | Status: SHIPPED | OUTPATIENT
Start: 2020-03-13 | End: 2020-06-09

## 2020-08-24 ENCOUNTER — TELEPHONE (OUTPATIENT)
Dept: INTERNAL MEDICINE | Facility: CLINIC | Age: 75
End: 2020-08-24

## 2020-08-24 NOTE — TELEPHONE ENCOUNTER
Patient's wife called and stated that Quang has a lot of anxiety, trouble sleeping and stomach issues.  She would rather he come in to see you.    Please advise    CB #[phone]

## 2020-08-25 ENCOUNTER — OFFICE VISIT (OUTPATIENT)
Dept: INTERNAL MEDICINE | Facility: CLINIC | Age: 75
End: 2020-08-25
Payer: COMMERCIAL

## 2020-08-25 VITALS
HEART RATE: 92 BPM | OXYGEN SATURATION: 97 % | DIASTOLIC BLOOD PRESSURE: 74 MMHG | HEIGHT: 72 IN | RESPIRATION RATE: 17 BRPM | SYSTOLIC BLOOD PRESSURE: 140 MMHG | TEMPERATURE: 97.7 F | WEIGHT: 191 LBS | BODY MASS INDEX: 25.87 KG/M2

## 2020-08-25 DIAGNOSIS — R42 LIGHTHEADEDNESS: Primary | ICD-10-CM

## 2020-08-25 DIAGNOSIS — R73.9 HYPERGLYCEMIA: ICD-10-CM

## 2020-08-25 DIAGNOSIS — R10.9 ABDOMINAL PAIN, UNSPECIFIED ABDOMINAL LOCATION: ICD-10-CM

## 2020-08-25 PROBLEM — J22 LRTI (LOWER RESPIRATORY TRACT INFECTION): Status: RESOLVED | Noted: 2019-03-19 | Resolved: 2020-08-25

## 2020-08-25 PROCEDURE — 93000 ELECTROCARDIOGRAM COMPLETE: CPT | Performed by: INTERNAL MEDICINE

## 2020-08-25 PROCEDURE — 99214 OFFICE O/P EST MOD 30 MIN: CPT | Performed by: INTERNAL MEDICINE

## 2020-08-25 ASSESSMENT — ENCOUNTER SYMPTOMS
COUGH: 0
LIGHT-HEADEDNESS: 1
SORE THROAT: 0
SHORTNESS OF BREATH: 0
CONFUSION: 0
BLOOD IN STOOL: 0
BACK PAIN: 0
ABDOMINAL PAIN: 1
DYSURIA: 0
HEADACHES: 0
FEVER: 0
NERVOUS/ANXIOUS: 1
VOMITING: 0
DIARRHEA: 0
NAUSEA: 0
FATIGUE: 0
DIZZINESS: 0
CHILLS: 0
HEMATURIA: 0
NUMBNESS: 0
BRUISES/BLEEDS EASILY: 0
EYE PAIN: 0
PALPITATIONS: 0

## 2020-08-25 NOTE — PATIENT INSTRUCTIONS
Get blood work  See cardiology  Increase fluids  Take the Protonix twice daily for 2 weeks  Follow up in one month

## 2020-08-25 NOTE — PROGRESS NOTES
Subjective      Patient ID: Quang Carrera is a 74 y.o. male     HPI     Here to discuss ongoing issues with lightheadedness and abdominal discomfort mostly indigestion and dyspepsia  On wed Played golf  Did not have much to drink and felt  lightheaded and weak  Started to feel anxiety tightness in his stomach also felt indigestion and dyspepsia takes the Protonix once a day and feels it is not helping  Denies nausea constipation or diarrhea,    He saw Dr. Andrade in February 2020 and had an endoscopy that showed short segment Brito's , small hiatal hernia  Small gastric polyp and gastritis      Past Medical History:   Diagnosis Date   • Colon polyps    • Hemangioma of liver    • History of Brito's esophagus    • Hypertension    • Inguinal hernia    • Lipid disorder        Past Surgical History:   Procedure Laterality Date   • COLONOSCOPY      2016   • EYE SURGERY         Family History   Problem Relation Age of Onset   • Brito's esophagus Biological Father        Social History     Socioeconomic History   • Marital status:      Spouse name: Not on file   • Number of children: Not on file   • Years of education: Not on file   • Highest education level: Not on file   Occupational History   • Not on file   Social Needs   • Financial resource strain: Not on file   • Food insecurity:     Worry: Not on file     Inability: Not on file   • Transportation needs:     Medical: Not on file     Non-medical: Not on file   Tobacco Use   • Smoking status: Never Smoker   • Smokeless tobacco: Never Used   Substance and Sexual Activity   • Alcohol use: Yes   • Drug use: Not on file   • Sexual activity: Not on file   Lifestyle   • Physical activity:     Days per week: Not on file     Minutes per session: Not on file   • Stress: Not on file   Relationships   • Social connections:     Talks on phone: Not on file     Gets together: Not on file     Attends Protestant service: Not on file     Active member of club or organization:  Not on file     Attends meetings of clubs or organizations: Not on file     Relationship status: Not on file   • Intimate partner violence:     Fear of current or ex partner: Not on file     Emotionally abused: Not on file     Physically abused: Not on file     Forced sexual activity: Not on file   Other Topics Concern   • Not on file   Social History Narrative   • Not on file       No Known Allergies    Current Outpatient Medications   Medication Sig Dispense Refill   • pantoprazole (PROTONIX) 40 mg EC tablet TAKE 1 TABLET BY MOUTH EVERY DAY 90 tablet 0     No current facility-administered medications for this visit.        Visit Vitals  /74   Pulse 92   Temp 36.5 °C (97.7 °F) (Temporal)   Resp 17   Ht 1.829 m (6')   Wt 86.6 kg (191 lb)   SpO2 97%   BMI 25.90 kg/m²         The following have been reviewed and updated as appropriate in this visit:  Allergies  Meds  Problems       Review of Systems   Constitutional: Negative for chills, fatigue and fever.   HENT: Negative for ear pain and sore throat.    Eyes: Negative for pain and visual disturbance.   Respiratory: Negative for cough and shortness of breath.    Cardiovascular: Negative for chest pain, palpitations and leg swelling.   Gastrointestinal: Positive for abdominal pain. Negative for blood in stool, diarrhea, nausea and vomiting.   Genitourinary: Negative for dysuria and hematuria.   Musculoskeletal: Negative for back pain.   Skin: Negative for rash.   Allergic/Immunologic: Negative for environmental allergies.   Neurological: Positive for light-headedness. Negative for dizziness, numbness and headaches.   Hematological: Does not bruise/bleed easily.   Psychiatric/Behavioral: Negative for confusion. The patient is nervous/anxious.        Objective       Physical Exam   Constitutional: He is oriented to person, place, and time. He appears well-developed and well-nourished.   HENT:   Head: Normocephalic and atraumatic.   Right Ear: External ear  normal.   Left Ear: External ear normal.   Nose: Nose normal.   Oral mucosa is dry   Eyes: Pupils are equal, round, and reactive to light. Conjunctivae and EOM are normal.   Neck: Normal range of motion. Neck supple. No thyromegaly present.   Cardiovascular: Normal rate, regular rhythm, normal heart sounds and intact distal pulses.   Pulmonary/Chest: Effort normal and breath sounds normal. No respiratory distress. He exhibits no tenderness.   Abdominal: Soft. Bowel sounds are normal. There is no tenderness. There is no guarding.   Tenderness epigastrium  Mild voluntary guarding  No rebound   Musculoskeletal: Normal range of motion. He exhibits no tenderness.   Lymphadenopathy:     He has no cervical adenopathy.   Neurological: He is alert and oriented to person, place, and time. No cranial nerve deficit or sensory deficit.   Skin: Skin is warm and dry. No rash noted.   Psychiatric: He has a normal mood and affect. His behavior is normal.   Nursing note and vitals reviewed.      Assessment/Plan     Lightheadedness  Persisting symptoms  Likely related to dehydration and poor fluid intake   Check labs and will check EKG  EKG shows first-degree AV block with PVC, right bundle branch block   he does not have any prior EKG to compare with   He was advsied to drink lots of fluids  Consult cardiology and will see dr hardik robertson this week    Abdominal pain  Likely due to gastritis  Check labs   he was advised to increase the pantoprazole to 40 mg twice daily for 2 weeks   Discussed diet changes  he will call me with an update next week    Hyperglycemia  Check FBS and A1c        Mary Wise MD  8/25/2020  10:06 PM

## 2020-08-26 ENCOUNTER — TELEPHONE (OUTPATIENT)
Dept: INTERNAL MEDICINE | Facility: CLINIC | Age: 75
End: 2020-08-26

## 2020-08-26 DIAGNOSIS — Z11.59 NEED FOR HEPATITIS C SCREENING TEST: Primary | ICD-10-CM

## 2020-08-26 NOTE — ASSESSMENT & PLAN NOTE
Persisting symptoms  Likely related to dehydration and poor fluid intake   Check labs and will check EKG  EKG shows first-degree AV block with PVC, right bundle branch block   he does not have any prior EKG to compare with   He was advsied to drink lots of fluids  Consult cardiology and will see dr hardik robertson this week

## 2020-08-26 NOTE — ASSESSMENT & PLAN NOTE
Likely due to gastritis  Check labs   he was advised to increase the pantoprazole to 40 mg twice daily for 2 weeks   Discussed diet changes  he will call me with an update next week

## 2020-08-28 LAB
ALBUMIN SERPL-MCNC: 4.8 G/DL (ref 3.7–4.7)
ALBUMIN/GLOB SERPL: 1.9 {RATIO} (ref 1.2–2.2)
ALP SERPL-CCNC: 72 IU/L (ref 39–117)
ALT SERPL-CCNC: 25 IU/L (ref 0–44)
AST SERPL-CCNC: 20 IU/L (ref 0–40)
BASOPHILS # BLD AUTO: 0.1 X10E3/UL (ref 0–0.2)
BASOPHILS NFR BLD AUTO: 1 %
BILIRUB SERPL-MCNC: 0.4 MG/DL (ref 0–1.2)
BUN SERPL-MCNC: 17 MG/DL (ref 8–27)
BUN/CREAT SERPL: 15 (ref 10–24)
CALCIUM SERPL-MCNC: 10.1 MG/DL (ref 8.6–10.2)
CHLORIDE SERPL-SCNC: 97 MMOL/L (ref 96–106)
CO2 SERPL-SCNC: 24 MMOL/L (ref 20–29)
CREAT SERPL-MCNC: 1.14 MG/DL (ref 0.76–1.27)
EOSINOPHIL # BLD AUTO: 0.1 X10E3/UL (ref 0–0.4)
EOSINOPHIL NFR BLD AUTO: 1 %
ERYTHROCYTE [DISTWIDTH] IN BLOOD BY AUTOMATED COUNT: 13.5 % (ref 11.6–15.4)
GLOBULIN SER CALC-MCNC: 2.5 G/DL (ref 1.5–4.5)
GLUCOSE SERPL-MCNC: 126 MG/DL (ref 65–99)
HBA1C MFR BLD: 5.7 % (ref 4.8–5.6)
HCT VFR BLD AUTO: 45.1 % (ref 37.5–51)
HCV AB S/CO SERPL IA: <0.1 S/CO RATIO (ref 0–0.9)
HGB BLD-MCNC: 14.8 G/DL (ref 13–17.7)
IMM GRANULOCYTES # BLD AUTO: 0 X10E3/UL (ref 0–0.1)
IMM GRANULOCYTES NFR BLD AUTO: 0 %
LAB CORP EGFR IF AFRICN AM: 73 ML/MIN/1.73
LAB CORP EGFR IF NONAFRICN AM: 63 ML/MIN/1.73
LDH SERPL-CCNC: 167 IU/L (ref 121–224)
LIPASE SERPL-CCNC: 41 U/L (ref 13–78)
LYMPHOCYTES # BLD AUTO: 2 X10E3/UL (ref 0.7–3.1)
LYMPHOCYTES NFR BLD AUTO: 28 %
MCH RBC QN AUTO: 28.8 PG (ref 26.6–33)
MCHC RBC AUTO-ENTMCNC: 32.8 G/DL (ref 31.5–35.7)
MCV RBC AUTO: 88 FL (ref 79–97)
MONOCYTES # BLD AUTO: 0.7 X10E3/UL (ref 0.1–0.9)
MONOCYTES NFR BLD AUTO: 10 %
NEUTROPHILS # BLD AUTO: 4.1 X10E3/UL (ref 1.4–7)
NEUTROPHILS NFR BLD AUTO: 60 %
PLATELET # BLD AUTO: 366 X10E3/UL (ref 150–450)
POTASSIUM SERPL-SCNC: 4.7 MMOL/L (ref 3.5–5.2)
PROT SERPL-MCNC: 7.3 G/DL (ref 6–8.5)
RBC # BLD AUTO: 5.14 X10E6/UL (ref 4.14–5.8)
SODIUM SERPL-SCNC: 136 MMOL/L (ref 134–144)
WBC # BLD AUTO: 6.9 X10E3/UL (ref 3.4–10.8)

## 2020-09-01 LAB — SPECIMEN STATUS: NORMAL

## 2020-09-08 ENCOUNTER — TELEPHONE (OUTPATIENT)
Dept: INTERNAL MEDICINE | Facility: CLINIC | Age: 75
End: 2020-09-08

## 2020-09-08 DIAGNOSIS — R42 LIGHTHEADEDNESS: ICD-10-CM

## 2020-09-08 DIAGNOSIS — R21 RASH: Primary | ICD-10-CM

## 2020-09-08 RX ORDER — PANTOPRAZOLE SODIUM 40 MG/1
TABLET, DELAYED RELEASE ORAL
Qty: 90 TABLET | Refills: 1 | Status: SHIPPED | OUTPATIENT
Start: 2020-09-08 | End: 2021-03-02 | Stop reason: SDUPTHER

## 2020-09-08 NOTE — TELEPHONE ENCOUNTER
Spoke with the patient and discussed that he still feels lightheadedness at times and feels weak at times he did see the cardiologist and is getting the testing done he had a rash in March and saw the dermatologist and would like to get tested for Lyme told him that I will order the test and [facility]

## 2020-09-10 LAB
B BURGDOR IGG PATRN SER IB-IMP: NEGATIVE
B BURGDOR IGM PATRN SER IB-IMP: NEGATIVE
B BURGDOR18KD IGG SER QL IB: ABNORMAL
B BURGDOR23KD IGG SER QL IB: PRESENT
B BURGDOR23KD IGM SER QL IB: ABNORMAL
B BURGDOR28KD IGG SER QL IB: ABNORMAL
B BURGDOR30KD IGG SER QL IB: ABNORMAL
B BURGDOR39KD IGG SER QL IB: ABNORMAL
B BURGDOR39KD IGM SER QL IB: ABNORMAL
B BURGDOR41KD IGG SER QL IB: PRESENT
B BURGDOR41KD IGM SER QL IB: ABNORMAL
B BURGDOR45KD IGG SER QL IB: ABNORMAL
B BURGDOR58KD IGG SER QL IB: ABNORMAL
B BURGDOR66KD IGG SER QL IB: ABNORMAL
B BURGDOR93KD IGG SER QL IB: PRESENT

## 2020-09-11 ENCOUNTER — TELEPHONE (OUTPATIENT)
Dept: INTERNAL MEDICINE | Facility: CLINIC | Age: 75
End: 2020-09-11

## 2020-09-11 RX ORDER — FAMOTIDINE 20 MG/1
20 TABLET, FILM COATED ORAL NIGHTLY PRN
Qty: 90 TABLET | Refills: 0 | Status: SHIPPED | OUTPATIENT
Start: 2020-09-11 | End: 2020-09-24

## 2020-09-11 NOTE — TELEPHONE ENCOUNTER
Spoke with the patient and reviewed the test results the Lyme is negative he feels a lot better his fatigue is improved and he also has less dizziness I advised him to take the Protonix once daily her stomach feels a lot better and to take famotidine in the evening with dinner for a month he was also advised to start B12 1000 MCG daily for 2 months he will follow-up with me

## 2020-09-24 ENCOUNTER — OFFICE VISIT (OUTPATIENT)
Dept: INTERNAL MEDICINE | Facility: CLINIC | Age: 75
End: 2020-09-24
Payer: COMMERCIAL

## 2020-09-24 VITALS
RESPIRATION RATE: 16 BRPM | HEIGHT: 72 IN | SYSTOLIC BLOOD PRESSURE: 130 MMHG | HEART RATE: 64 BPM | DIASTOLIC BLOOD PRESSURE: 78 MMHG | BODY MASS INDEX: 26.14 KG/M2 | WEIGHT: 193 LBS | TEMPERATURE: 97.8 F

## 2020-09-24 DIAGNOSIS — K21.9 GASTROESOPHAGEAL REFLUX DISEASE, ESOPHAGITIS PRESENCE NOT SPECIFIED: ICD-10-CM

## 2020-09-24 DIAGNOSIS — J01.90 ACUTE SINUSITIS, RECURRENCE NOT SPECIFIED, UNSPECIFIED LOCATION: ICD-10-CM

## 2020-09-24 DIAGNOSIS — R42 LIGHTHEADEDNESS: ICD-10-CM

## 2020-09-24 PROCEDURE — 99214 OFFICE O/P EST MOD 30 MIN: CPT | Performed by: INTERNAL MEDICINE

## 2020-09-24 RX ORDER — AZITHROMYCIN 250 MG/1
250 TABLET, FILM COATED ORAL DAILY
Qty: 6 TABLET | Refills: 0 | Status: SHIPPED | OUTPATIENT
Start: 2020-09-24 | End: 2020-09-29

## 2020-09-24 RX ORDER — FLUTICASONE PROPIONATE 50 MCG
2 SPRAY, SUSPENSION (ML) NASAL DAILY
Qty: 1 BOTTLE | Refills: 1 | Status: SHIPPED | OUTPATIENT
Start: 2020-09-24 | End: 2020-10-16 | Stop reason: SDUPTHER

## 2020-09-24 ASSESSMENT — ENCOUNTER SYMPTOMS
SINUS PAIN: 1
PALPITATIONS: 0
SORE THROAT: 0
COUGH: 0
NAUSEA: 0
NUMBNESS: 0
CHILLS: 0
SHORTNESS OF BREATH: 0
BRUISES/BLEEDS EASILY: 0
HEMATURIA: 0
FATIGUE: 0
DIARRHEA: 0
BLOOD IN STOOL: 0
DYSURIA: 0
VOMITING: 0
ABDOMINAL PAIN: 0
EYE PAIN: 0
SINUS PRESSURE: 1
BACK PAIN: 0
FEVER: 0
DIZZINESS: 0
CONFUSION: 0
HEADACHES: 0

## 2020-09-24 NOTE — PROGRESS NOTES
Patient was seen for their 1 month f/u at [facility] with a total weight loss of -2# since initial consult. Happy New Year! Subjective      Patient ID: Quang Carrera is a 75 y.o. male     HPI     Here for follow-up on the lightheadedness  He did the Holter monitor and is awaiting results he is scheduled to get the echocardiogram and carotid Dopplers with Dr. Martinez  He  is not keen to get a stress test at this time   is lightheadedness has improved but he also has had ear and sinus congestion and feels that allergies are causing sinusitis he has postnasal drip and discolored drainage    Past Medical History:   Diagnosis Date   • Colon polyps    • Hemangioma of liver    • History of Brito's esophagus    • Hypertension    • Inguinal hernia    • Lipid disorder        Past Surgical History:   Procedure Laterality Date   • COLONOSCOPY      2016   • EYE SURGERY         Family History   Problem Relation Age of Onset   • Brito's esophagus Biological Father        Social History     Socioeconomic History   • Marital status:      Spouse name: Not on file   • Number of children: Not on file   • Years of education: Not on file   • Highest education level: Not on file   Occupational History   • Not on file   Social Needs   • Financial resource strain: Not on file   • Food insecurity     Worry: Not on file     Inability: Not on file   • Transportation needs     Medical: Not on file     Non-medical: Not on file   Tobacco Use   • Smoking status: Never Smoker   • Smokeless tobacco: Never Used   Substance and Sexual Activity   • Alcohol use: Yes   • Drug use: Not on file   • Sexual activity: Not on file   Lifestyle   • Physical activity     Days per week: Not on file     Minutes per session: Not on file   • Stress: Not on file   Relationships   • Social connections     Talks on phone: Not on file     Gets together: Not on file     Attends [employer] service: Not on file     Active member of club or organization: Not on file     Attends meetings of clubs or organizations: Not on file     Relationship status: Not on file   • Intimate partner  violence     Fear of current or ex partner: Not on file     Emotionally abused: Not on file     Physically abused: Not on file     Forced sexual activity: Not on file   Other Topics Concern   • Not on file   Social History Narrative   • Not on file       No Known Allergies    Current Outpatient Medications   Medication Sig Dispense Refill   • pantoprazole (PROTONIX) 40 mg EC tablet TAKE 1 TABLET BY MOUTH EVERY DAY 90 tablet 1   • azithromycin (ZITHROMAX) 250 mg tablet Take 1 tablet (250 mg total) by mouth daily for 5 days. Take 2 tablets the first day, then 1 tablet daily for 4 days. 6 tablet 0   • fluticasone propionate (FLONASE) 50 mcg/actuation nasal spray Administer 2 sprays into each nostril daily. 1 Bottle 1     No current facility-administered medications for this visit.        Visit Vitals  /78   Pulse 64   Temp 36.6 °C (97.8 °F) (Temporal)   Resp 16   Ht 1.829 m (6')   Wt 87.5 kg (193 lb)   BMI 26.18 kg/m²         The following have been reviewed and updated as appropriate in this visit:  Allergies  Meds  Problems       Review of Systems   Constitutional: Negative for chills, fatigue and fever.   HENT: Positive for congestion, postnasal drip, sinus pressure and sinus pain. Negative for ear pain and sore throat.    Eyes: Negative for pain and visual disturbance.   Respiratory: Negative for cough and shortness of breath.    Cardiovascular: Negative for chest pain, palpitations and leg swelling.   Gastrointestinal: Negative for abdominal pain, blood in stool, diarrhea, nausea and vomiting.   Genitourinary: Negative for dysuria and hematuria.   Musculoskeletal: Negative for back pain.   Skin: Negative for rash.   Allergic/Immunologic: Negative for environmental allergies.   Neurological: Negative for dizziness, numbness and headaches.   Hematological: Does not bruise/bleed easily.   Psychiatric/Behavioral: Negative for confusion.       Objective       Physical Exam  Vitals signs and nursing note  reviewed.   Constitutional:       Appearance: He is well-developed.   HENT:      Head: Normocephalic and atraumatic.      Right Ear: External ear normal.      Left Ear: External ear normal.      Nose: Nose normal.      Comments: Nasal turbinates swollen and congested  Discolored postnasal drip     Mouth/Throat:      Mouth: Mucous membranes are dry.   Eyes:      Conjunctiva/sclera: Conjunctivae normal.      Pupils: Pupils are equal, round, and reactive to light.   Neck:      Musculoskeletal: Normal range of motion and neck supple.      Thyroid: No thyromegaly.   Cardiovascular:      Rate and Rhythm: Normal rate and regular rhythm.      Heart sounds: Normal heart sounds.   Pulmonary:      Effort: Pulmonary effort is normal. No respiratory distress.      Breath sounds: Normal breath sounds.   Chest:      Chest wall: No tenderness.   Abdominal:      General: Bowel sounds are normal.      Palpations: Abdomen is soft.      Tenderness: There is no abdominal tenderness. There is no guarding.      Hernia: A hernia is present.   Musculoskeletal: Normal range of motion.         General: No tenderness.   Lymphadenopathy:      Cervical: No cervical adenopathy.   Skin:     General: Skin is warm and dry.      Findings: No rash.   Neurological:      Mental Status: He is alert and oriented to person, place, and time.      Cranial Nerves: No cranial nerve deficit.      Sensory: No sensory deficit.   Psychiatric:         Behavior: Behavior normal.         Assessment/Plan     Acute sinusitis  Persisting symptoms  Discussed that he will start fluticasone nasal spray, 2 sprays in each nostril daily for 4 weeks he will start Allegra 180 mg daily in the morning for 5 days  Start azithromycin with food 500 mg today and then 250 mg daily for 4 days  Drink lots of fluids   he will call me with an update next week    Lightheadedness  Improved  He was advised to complete the work-up by cardiology and follow-up with Dr. Martinez he awaits the  results of the Holter monitor and will schedule the echocardiogram and carotid Dopplers  Advised him to continue to drink enough fluids    Gastroesophageal reflux disease  Feels better  He will continue the Protonix 40 mg p.o. daily  Discussed diet and lifestyle changes        Mary Wise MD  9/24/2020  8:35 PM

## 2020-09-24 NOTE — PATIENT INSTRUCTIONS
PLAN        Drink lots of fluids  Start flonase nasal spray daily for 4 weeks  Start Allegra daily in am for 5 days  Start zpak with food   Call me next week  Follow up in 2 months

## 2020-09-25 NOTE — ASSESSMENT & PLAN NOTE
Improved  He was advised to complete the work-up by cardiology and follow-up with Dr. Martinez he awaits the results of the Holter monitor and will schedule the echocardiogram and carotid Dopplers  Advised him to continue to drink enough fluids

## 2020-09-25 NOTE — ASSESSMENT & PLAN NOTE
Feels better  He will continue the Protonix 40 mg p.o. daily  Discussed diet and lifestyle changes

## 2020-09-25 NOTE — ASSESSMENT & PLAN NOTE
Persisting symptoms  Discussed that he will start fluticasone nasal spray, 2 sprays in each nostril daily for 4 weeks he will start Allegra 180 mg daily in the morning for 5 days  Start azithromycin with food 500 mg today and then 250 mg daily for 4 days  Drink lots of fluids   he will call me with an update next week

## 2020-10-01 ENCOUNTER — TELEPHONE (OUTPATIENT)
Dept: INTERNAL MEDICINE | Facility: CLINIC | Age: 75
End: 2020-10-01

## 2020-10-01 NOTE — TELEPHONE ENCOUNTER
Pt called back to advise he is feeling pretty good. If you'd like to speak to him you can call back, ph# [phone]

## 2020-10-02 NOTE — TELEPHONE ENCOUNTER
Spoke with the patient and he told me that he feels a lot better since he started the antibiotic and the Flonase  He also told me that he is getting the stress test and a Holter monitor done with cardiology and will follow-up with me afterwards

## 2020-10-16 RX ORDER — FLUTICASONE PROPIONATE 50 MCG
2 SPRAY, SUSPENSION (ML) NASAL DAILY
Qty: 1 BOTTLE | Refills: 1 | Status: SHIPPED | OUTPATIENT
Start: 2020-10-16 | End: 2020-11-25

## 2020-11-24 ENCOUNTER — OFFICE VISIT (OUTPATIENT)
Dept: INTERNAL MEDICINE | Facility: CLINIC | Age: 75
End: 2020-11-24
Payer: COMMERCIAL

## 2020-11-24 VITALS
HEART RATE: 80 BPM | WEIGHT: 198.2 LBS | DIASTOLIC BLOOD PRESSURE: 80 MMHG | HEIGHT: 72 IN | OXYGEN SATURATION: 97 % | SYSTOLIC BLOOD PRESSURE: 120 MMHG | TEMPERATURE: 96.8 F | BODY MASS INDEX: 26.84 KG/M2 | RESPIRATION RATE: 16 BRPM

## 2020-11-24 DIAGNOSIS — R42 LIGHTHEADEDNESS: Primary | ICD-10-CM

## 2020-11-24 DIAGNOSIS — K21.9 GASTROESOPHAGEAL REFLUX DISEASE, UNSPECIFIED WHETHER ESOPHAGITIS PRESENT: ICD-10-CM

## 2020-11-24 PROCEDURE — 99213 OFFICE O/P EST LOW 20 MIN: CPT | Mod: 25 | Performed by: INTERNAL MEDICINE

## 2020-11-24 PROCEDURE — G0008 ADMIN INFLUENZA VIRUS VAC: HCPCS | Performed by: INTERNAL MEDICINE

## 2020-11-24 PROCEDURE — 90694 VACC AIIV4 NO PRSRV 0.5ML IM: CPT | Performed by: INTERNAL MEDICINE

## 2020-11-24 NOTE — PROGRESS NOTES
Subjective      Patient ID: Quang Carrera is a 75 y.o. male     HPI       Here for follow up  He had pharmacologic stress test,echo and MCOT which did not show any arrhythmia  He has no dizziness now and feels much better      Past Medical History:   Diagnosis Date   • Colon polyps    • Hemangioma of liver    • History of Brito's esophagus    • Hypertension    • Inguinal hernia    • Lipid disorder        Past Surgical History:   Procedure Laterality Date   • COLONOSCOPY      2016   • EYE SURGERY         Family History   Problem Relation Age of Onset   • Brito's esophagus Biological Father        Social History     Socioeconomic History   • Marital status:      Spouse name: Not on file   • Number of children: Not on file   • Years of education: Not on file   • Highest education level: Not on file   Occupational History   • Not on file   Social Needs   • Financial resource strain: Not on file   • Food insecurity     Worry: Not on file     Inability: Not on file   • Transportation needs     Medical: Not on file     Non-medical: Not on file   Tobacco Use   • Smoking status: Never Smoker   • Smokeless tobacco: Never Used   Substance and Sexual Activity   • Alcohol use: Yes   • Drug use: Not on file   • Sexual activity: Not on file   Lifestyle   • Physical activity     Days per week: Not on file     Minutes per session: Not on file   • Stress: Not on file   Relationships   • Social connections     Talks on phone: Not on file     Gets together: Not on file     Attends Orthodoxy service: Not on file     Active member of club or organization: Not on file     Attends meetings of clubs or organizations: Not on file     Relationship status: Not on file   • Intimate partner violence     Fear of current or ex partner: Not on file     Emotionally abused: Not on file     Physically abused: Not on file     Forced sexual activity: Not on file   Other Topics Concern   • Not on file   Social History Narrative   • Not on file        No Known Allergies    Current Outpatient Medications   Medication Sig Dispense Refill   • pantoprazole (PROTONIX) 40 mg EC tablet TAKE 1 TABLET BY MOUTH EVERY DAY 90 tablet 1     No current facility-administered medications for this visit.        Visit Vitals  /80   Pulse 80   Temp (!) 36 °C (96.8 °F) (Temporal)   Resp 16   Ht 1.829 m (6')   Wt 89.9 kg (198 lb 3.2 oz)   SpO2 97%   BMI 26.88 kg/m²         The following have been reviewed and updated as appropriate in this visit:  Allergies  Meds  Problems       Review of Systems   Constitutional: Negative for chills, fatigue and fever.   HENT: Negative for ear pain and sore throat.    Eyes: Negative for pain and visual disturbance.   Respiratory: Negative for cough and shortness of breath.    Cardiovascular: Negative for chest pain, palpitations and leg swelling.   Gastrointestinal: Negative for abdominal pain, blood in stool, diarrhea, nausea and vomiting.   Genitourinary: Negative for dysuria and hematuria.   Musculoskeletal: Negative for back pain.   Skin: Negative for rash.   Allergic/Immunologic: Negative for environmental allergies.   Neurological: Negative for dizziness, numbness and headaches.   Hematological: Does not bruise/bleed easily.   Psychiatric/Behavioral: Negative for confusion.       Objective       Physical Exam  Vitals signs and nursing note reviewed.   Constitutional:       Appearance: He is well-developed.   HENT:      Head: Normocephalic and atraumatic.      Right Ear: External ear normal.      Left Ear: External ear normal.      Nose: Nose normal.   Eyes:      Conjunctiva/sclera: Conjunctivae normal.      Pupils: Pupils are equal, round, and reactive to light.   Neck:      Musculoskeletal: Normal range of motion and neck supple.      Thyroid: No thyromegaly.   Cardiovascular:      Rate and Rhythm: Normal rate and regular rhythm.      Heart sounds: Normal heart sounds.   Pulmonary:      Effort: Pulmonary effort is normal. No  respiratory distress.      Breath sounds: Normal breath sounds.   Chest:      Chest wall: No tenderness.   Abdominal:      General: Bowel sounds are normal.      Palpations: Abdomen is soft.      Tenderness: There is no abdominal tenderness. There is no guarding.      Hernia: A hernia is present.   Musculoskeletal: Normal range of motion.         General: No tenderness.   Lymphadenopathy:      Cervical: No cervical adenopathy.   Skin:     General: Skin is warm and dry.      Findings: No rash.   Neurological:      Mental Status: He is alert and oriented to person, place, and time.      Cranial Nerves: No cranial nerve deficit.      Sensory: No sensory deficit.   Psychiatric:         Behavior: Behavior normal.         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 daily  Discussed diet and lifestyle changes          Flu vaccine given today        Mary Wise MD  11/25/2020  7:19 AM

## 2020-11-25 ASSESSMENT — ENCOUNTER SYMPTOMS
BLOOD IN STOOL: 0
DIARRHEA: 0
FEVER: 0
DIZZINESS: 0
VOMITING: 0
BACK PAIN: 0
NUMBNESS: 0
COUGH: 0
EYE PAIN: 0
NAUSEA: 0
FATIGUE: 0
PALPITATIONS: 0
SORE THROAT: 0
HEADACHES: 0
BRUISES/BLEEDS EASILY: 0
ABDOMINAL PAIN: 0
CHILLS: 0
CONFUSION: 0
HEMATURIA: 0
SHORTNESS OF BREATH: 0
DYSURIA: 0

## 2020-11-25 NOTE — ASSESSMENT & PLAN NOTE
Resolved  reviewed he had negative pharmacologic stress test,mild aortic sclerosis on echo and no arrhythmia on MCOT  advised him to drink lots of fluids

## 2020-11-25 NOTE — ASSESSMENT & PLAN NOTE
Feels better  He will continue the Protonix 40 mg p.o. daily  Discussed diet and lifestyle changes          Flu vaccine given today

## 2020-12-03 RX ORDER — FAMOTIDINE 20 MG/1
20 TABLET, FILM COATED ORAL NIGHTLY PRN
Qty: 90 TABLET | Refills: 0 | Status: SHIPPED | OUTPATIENT
Start: 2020-12-03 | End: 2021-05-25

## 2021-03-03 RX ORDER — PANTOPRAZOLE SODIUM 40 MG/1
40 TABLET, DELAYED RELEASE ORAL
Qty: 90 TABLET | Refills: 1 | Status: SHIPPED | OUTPATIENT
Start: 2021-03-03 | End: 2021-05-25

## 2021-04-14 DIAGNOSIS — Z23 ENCOUNTER FOR IMMUNIZATION: ICD-10-CM

## 2021-05-25 ENCOUNTER — OFFICE VISIT (OUTPATIENT)
Dept: INTERNAL MEDICINE | Facility: CLINIC | Age: 76
End: 2021-05-25
Payer: COMMERCIAL

## 2021-05-25 VITALS
SYSTOLIC BLOOD PRESSURE: 140 MMHG | BODY MASS INDEX: 26.55 KG/M2 | HEART RATE: 80 BPM | TEMPERATURE: 97.5 F | WEIGHT: 196 LBS | OXYGEN SATURATION: 97 % | DIASTOLIC BLOOD PRESSURE: 72 MMHG | HEIGHT: 72 IN | RESPIRATION RATE: 15 BRPM

## 2021-05-25 DIAGNOSIS — K21.9 GASTROESOPHAGEAL REFLUX DISEASE, UNSPECIFIED WHETHER ESOPHAGITIS PRESENT: ICD-10-CM

## 2021-05-25 DIAGNOSIS — E78.9 LIPID DISORDER: ICD-10-CM

## 2021-05-25 DIAGNOSIS — I35.8 AORTIC VALVE SCLEROSIS: ICD-10-CM

## 2021-05-25 DIAGNOSIS — R42 LIGHTHEADEDNESS: Primary | ICD-10-CM

## 2021-05-25 PROCEDURE — 3008F BODY MASS INDEX DOCD: CPT | Performed by: INTERNAL MEDICINE

## 2021-05-25 PROCEDURE — 99213 OFFICE O/P EST LOW 20 MIN: CPT | Performed by: INTERNAL MEDICINE

## 2021-05-25 ASSESSMENT — ENCOUNTER SYMPTOMS
EYE PAIN: 0
FEVER: 0
SHORTNESS OF BREATH: 0
DIARRHEA: 0
PALPITATIONS: 0
BRUISES/BLEEDS EASILY: 0
DIZZINESS: 0
BACK PAIN: 0
ABDOMINAL PAIN: 0
CONFUSION: 0
DYSURIA: 0
HEMATURIA: 0
CHILLS: 0
HEADACHES: 0
FATIGUE: 0
SORE THROAT: 0
COUGH: 0
VOMITING: 0
NAUSEA: 0
NUMBNESS: 0
BLOOD IN STOOL: 0

## 2021-05-25 ASSESSMENT — PATIENT HEALTH QUESTIONNAIRE - PHQ9: SUM OF ALL RESPONSES TO PHQ9 QUESTIONS 1 & 2: 0

## 2021-05-25 NOTE — PROGRESS NOTES
Subjective      Patient ID: Quang Carrera is a 75 y.o. male     HPI     Here for 6-month follow-up  He has not had any more lightheadedness he saw Dr. Martinez cardiology and had a stress test that was negative and echo that showed mild sclerosis and the Holter monitor was negative for any arrhythmia  He stopped taking the pantoprazole as he feels it was causing him  Nausea   he does not have any reflux symptoms on a daily basis  He had upper endoscopy in February 2020 by Dr. Andrade    Past Medical History:   Diagnosis Date   • Colon polyps    • Hemangioma of liver    • History of Brito's esophagus    • Hypertension    • Inguinal hernia    • Lipid disorder        Past Surgical History:   Procedure Laterality Date   • COLONOSCOPY      2016   • EYE SURGERY         Family History   Problem Relation Age of Onset   • Brito's esophagus Biological Father        Social History     Socioeconomic History   • Marital status:      Spouse name: Not on file   • Number of children: Not on file   • Years of education: Not on file   • Highest education level: Not on file   Occupational History   • Not on file   Tobacco Use   • Smoking status: Never Smoker   • Smokeless tobacco: Never Used   Substance and Sexual Activity   • Alcohol use: Yes   • Drug use: Not on file   • Sexual activity: Not on file   Other Topics Concern   • Not on file   Social History Narrative   • Not on file     Social Determinants of Health     Financial Resource Strain:    • Difficulty of Paying Living Expenses:    Food Insecurity:    • Worried About Running Out of Food in the Last Year:    • Ran Out of Food in the Last Year:    Transportation Needs:    • Lack of Transportation (Medical):    • Lack of Transportation (Non-Medical):    Physical Activity:    • Days of Exercise per Week:    • Minutes of Exercise per Session:    Stress:    • Feeling of Stress :    Social Connections:    • Frequency of Communication with Friends and Family:    • Frequency of  Social Gatherings with Friends and Family:    • Attends Latter day Services:    • Active Member of Clubs or Organizations:    • Attends Club or Organization Meetings:    • Marital Status:    Intimate Partner Violence:    • Fear of Current or Ex-Partner:    • Emotionally Abused:    • Physically Abused:    • Sexually Abused:        No Known Allergies    No current outpatient medications on file.     No current facility-administered medications for this visit.       Visit Vitals  /72   Pulse 80   Temp 36.4 °C (97.5 °F) (Temporal)   Resp 15   Ht 1.829 m (6')   Wt 88.9 kg (196 lb)   SpO2 97%   BMI 26.58 kg/m²         The following have been reviewed and updated as appropriate in this visit:  Allergies  Meds  Problems       Review of Systems   Constitutional: Negative for chills, fatigue and fever.   HENT: Negative for ear pain and sore throat.    Eyes: Negative for pain and visual disturbance.   Respiratory: Negative for cough and shortness of breath.    Cardiovascular: Negative for chest pain, palpitations and leg swelling.   Gastrointestinal: Negative for abdominal pain, blood in stool, diarrhea, nausea and vomiting.   Genitourinary: Negative for dysuria and hematuria.   Musculoskeletal: Negative for back pain.   Skin: Negative for rash.   Allergic/Immunologic: Negative for environmental allergies.   Neurological: Negative for dizziness, numbness and headaches.   Hematological: Does not bruise/bleed easily.   Psychiatric/Behavioral: Negative for confusion.       Objective       Physical Exam  Vitals and nursing note reviewed.   Constitutional:       Appearance: He is well-developed.   HENT:      Head: Normocephalic and atraumatic.      Right Ear: External ear normal.      Left Ear: External ear normal.      Nose: Nose normal.   Eyes:      Conjunctiva/sclera: Conjunctivae normal.      Pupils: Pupils are equal, round, and reactive to light.   Neck:      Thyroid: No thyromegaly.   Cardiovascular:      Rate and  Rhythm: Normal rate and regular rhythm.      Heart sounds: Normal heart sounds.   Pulmonary:      Effort: Pulmonary effort is normal. No respiratory distress.      Breath sounds: Normal breath sounds.   Chest:      Chest wall: No tenderness.   Abdominal:      General: Bowel sounds are normal.      Palpations: Abdomen is soft.      Tenderness: There is no abdominal tenderness. There is no guarding.      Comments: Bilateral inguinal hernia   Musculoskeletal:         General: No tenderness. Normal range of motion.      Cervical back: Normal range of motion and neck supple.   Lymphadenopathy:      Cervical: No cervical adenopathy.   Skin:     General: Skin is warm and dry.      Findings: No rash.   Neurological:      Mental Status: He is alert and oriented to person, place, and time.      Cranial Nerves: No cranial nerve deficit.      Sensory: No sensory deficit.   Psychiatric:         Behavior: Behavior normal.         Assessment/Plan     Lightheadedness  Asymptomatic  He has not had any other episodes of lightheadedness  He drinks enough fluids  Continue to monitor    Aortic valve sclerosis  Stable  He has mild aortic valve sclerosis \   will be monitored by yearly echocardiograms    Lipid disorder  History of elevated lipids  Discussed diet and lifestyle changes    Gastroesophageal reflux disease  Controlled  He stopped taking the Protonix since it was causing him nausea and he feels a lot better  I discussed with him that he had an endoscopy in 2020 which showed a short segment Brito's and Dr. Andrade advised him to continue the Protonix  He feels he does not need it and if he has persisting symptoms he will restart it         Mary Wise MD  5/25/2021  9:10 PM

## 2021-05-26 NOTE — ASSESSMENT & PLAN NOTE
Controlled  He stopped taking the Protonix since it was causing him nausea and he feels a lot better  I discussed with him that he had an endoscopy in 2020 which showed a short segment Brito's and Dr. Andrade advised him to continue the Protonix  He feels he does not need it and if he has persisting symptoms he will restart it

## 2021-05-26 NOTE — ASSESSMENT & PLAN NOTE
Asymptomatic  He has not had any other episodes of lightheadedness  He drinks enough fluids  Continue to monitor

## 2021-07-08 ENCOUNTER — CLINICAL SUPPORT (OUTPATIENT)
Dept: INTERNAL MEDICINE | Facility: CLINIC | Age: 76
End: 2021-07-08
Payer: COMMERCIAL

## 2021-07-08 DIAGNOSIS — Z23 NEED FOR TDAP VACCINATION: Primary | ICD-10-CM

## 2021-07-08 PROCEDURE — 90715 TDAP VACCINE 7 YRS/> IM: CPT | Performed by: INTERNAL MEDICINE

## 2021-07-08 PROCEDURE — 90471 IMMUNIZATION ADMIN: CPT | Performed by: INTERNAL MEDICINE

## 2021-10-12 ENCOUNTER — OFFICE VISIT (OUTPATIENT)
Dept: INTERNAL MEDICINE | Facility: CLINIC | Age: 76
End: 2021-10-12
Payer: COMMERCIAL

## 2021-10-12 VITALS
RESPIRATION RATE: 16 BRPM | BODY MASS INDEX: 26.68 KG/M2 | TEMPERATURE: 97.7 F | DIASTOLIC BLOOD PRESSURE: 80 MMHG | OXYGEN SATURATION: 97 % | HEIGHT: 72 IN | SYSTOLIC BLOOD PRESSURE: 120 MMHG | HEART RATE: 85 BPM | WEIGHT: 197 LBS

## 2021-10-12 DIAGNOSIS — K21.9 GASTROESOPHAGEAL REFLUX DISEASE, UNSPECIFIED WHETHER ESOPHAGITIS PRESENT: ICD-10-CM

## 2021-10-12 DIAGNOSIS — R42 LIGHTHEADEDNESS: ICD-10-CM

## 2021-10-12 DIAGNOSIS — R73.9 HYPERGLYCEMIA: ICD-10-CM

## 2021-10-12 DIAGNOSIS — E78.9 LIPID DISORDER: ICD-10-CM

## 2021-10-12 DIAGNOSIS — E55.9 VITAMIN D DEFICIENCY: ICD-10-CM

## 2021-10-12 PROCEDURE — 99214 OFFICE O/P EST MOD 30 MIN: CPT | Performed by: INTERNAL MEDICINE

## 2021-10-12 PROCEDURE — 3008F BODY MASS INDEX DOCD: CPT | Performed by: INTERNAL MEDICINE

## 2021-10-12 ASSESSMENT — ENCOUNTER SYMPTOMS
FEVER: 0
BLOOD IN STOOL: 0
VOMITING: 0
HEADACHES: 0
COUGH: 0
DYSURIA: 0
CHILLS: 0
EYE PAIN: 0
BRUISES/BLEEDS EASILY: 0
SHORTNESS OF BREATH: 0
SORE THROAT: 0
BACK PAIN: 0
DIZZINESS: 0
ABDOMINAL PAIN: 0
PALPITATIONS: 0
NUMBNESS: 0
NAUSEA: 0
CONFUSION: 0
FATIGUE: 0
DIARRHEA: 0
HEMATURIA: 0

## 2021-10-12 NOTE — PATIENT INSTRUCTIONS
PLAN      Get fasting blood work  Drink lots of fluids  Flu vaccine  'shingles vaccine  Cont to eat healthy and exercise  See me in may

## 2021-10-12 NOTE — PROGRESS NOTES
Subjective      Patient ID: Quang Carrera is a 76 y.o. male     HPI     Here for a follow-up  He feels well and has had no recurrence of dizziness  He was evaluated by cardiology Dr. Martinez and he had a stress test echo and M cot which were all normal  EGD was in 2020 by Dr Andrade  He has had no symptoms of acid reflux and does not use the pantoprazole anymore   he has a right inguinal hernia for many years and has not had any pain or discomfort    Past Medical History:   Diagnosis Date   • Colon polyps    • Hemangioma of liver    • History of Brito's esophagus    • Hypertension    • Inguinal hernia    • Lipid disorder        Past Surgical History:   Procedure Laterality Date   • COLONOSCOPY      2016   • EYE SURGERY         Family History   Problem Relation Age of Onset   • Brito's esophagus Biological Father        Social History     Socioeconomic History   • Marital status:      Spouse name: Not on file   • Number of children: Not on file   • Years of education: Not on file   • Highest education level: Not on file   Occupational History   • Not on file   Tobacco Use   • Smoking status: Never Smoker   • Smokeless tobacco: Never Used   Substance and Sexual Activity   • Alcohol use: Yes   • Drug use: Not on file   • Sexual activity: Not on file   Other Topics Concern   • Not on file   Social History Narrative   • Not on file     Social Determinants of Health     Financial Resource Strain:    • Difficulty of Paying Living Expenses: Not on file   Food Insecurity:    • Worried About Running Out of Food in the Last Year: Not on file   • Ran Out of Food in the Last Year: Not on file   Transportation Needs:    • Lack of Transportation (Medical): Not on file   • Lack of Transportation (Non-Medical): Not on file   Physical Activity:    • Days of Exercise per Week: Not on file   • Minutes of Exercise per Session: Not on file   Stress:    • Feeling of Stress : Not on file   Social Connections:    • Frequency of  Communication with Friends and Family: Not on file   • Frequency of Social Gatherings with Friends and Family: Not on file   • Attends Jew Services: Not on file   • Active Member of Clubs or Organizations: Not on file   • Attends Club or Organization Meetings: Not on file   • Marital Status: Not on file   Intimate Partner Violence:    • Fear of Current or Ex-Partner: Not on file   • Emotionally Abused: Not on file   • Physically Abused: Not on file   • Sexually Abused: Not on file   Housing Stability:    • Unable to Pay for Housing in the Last Year: Not on file   • Number of Places Lived in the Last Year: Not on file   • Unstable Housing in the Last Year: Not on file       No Known Allergies    No current outpatient medications on file.     No current facility-administered medications for this visit.       Visit Vitals  /80   Pulse 85   Temp 36.5 °C (97.7 °F) (Temporal)   Resp 16   Ht 1.829 m (6')   Wt 89.4 kg (197 lb)   SpO2 97%   BMI 26.72 kg/m²         The following have been reviewed and updated as appropriate in this visit:  Allergies  Meds  Problems       Review of Systems   Constitutional: Negative for chills, fatigue and fever.   HENT: Positive for postnasal drip. Negative for ear pain and sore throat.    Eyes: Negative for pain and visual disturbance.   Respiratory: Negative for cough and shortness of breath.    Cardiovascular: Negative for chest pain, palpitations and leg swelling.   Gastrointestinal: Negative for abdominal pain, blood in stool, diarrhea, nausea and vomiting.   Genitourinary: Negative for dysuria and hematuria.   Musculoskeletal: Negative for back pain.   Skin: Negative for rash.   Allergic/Immunologic: Negative for environmental allergies.   Neurological: Negative for dizziness, numbness and headaches.   Hematological: Does not bruise/bleed easily.   Psychiatric/Behavioral: Negative for confusion.       Objective       Physical Exam  Vitals and nursing note reviewed.    Constitutional:       Appearance: He is well-developed.   HENT:      Head: Normocephalic and atraumatic.      Right Ear: External ear normal.      Left Ear: External ear normal.      Nose: Nose normal.   Eyes:      Conjunctiva/sclera: Conjunctivae normal.      Pupils: Pupils are equal, round, and reactive to light.   Neck:      Thyroid: No thyromegaly.   Cardiovascular:      Rate and Rhythm: Normal rate and regular rhythm.      Heart sounds: Normal heart sounds.   Pulmonary:      Effort: Pulmonary effort is normal. No respiratory distress.      Breath sounds: Normal breath sounds.   Chest:      Chest wall: No tenderness.   Abdominal:      General: Bowel sounds are normal.      Palpations: Abdomen is soft.      Tenderness: There is no abdominal tenderness. There is no guarding.      Comments: Large right inguinal hernia nontender   Musculoskeletal:         General: No tenderness. Normal range of motion.      Cervical back: Normal range of motion and neck supple.   Lymphadenopathy:      Cervical: No cervical adenopathy.   Skin:     General: Skin is warm and dry.      Findings: No rash.   Neurological:      Mental Status: He is alert and oriented to person, place, and time.      Cranial Nerves: No cranial nerve deficit.      Sensory: No sensory deficit.   Psychiatric:         Behavior: Behavior normal.         Assessment/Plan     Lipid disorder  History of elevated lipids  Discussed diet and lifestyle changes  He is not keen on medications  Labs ordered    Hyperglycemia  Mild elevation of blood sugar  Last A1c was 5.8  Discussed diet and lifestyle changes    Vitamin D deficiency  History of vitamin D deficiency in the past  Labs ordered    Gastroesophageal reflux disease  Stable and asymptomatic  Had an endoscopy in 2020 by Dr. Lupe Warner  Asymptomatic  He has not had any other episodes of lightheadedness  He drinks enough fluids  Continue to monitor        Mary Wise MD  10/12/2021  8:04 PM

## 2021-10-13 NOTE — ASSESSMENT & PLAN NOTE
History of elevated lipids  Discussed diet and lifestyle changes  He is not keen on medications  Labs ordered

## 2021-10-20 ENCOUNTER — TELEPHONE (OUTPATIENT)
Dept: INTERNAL MEDICINE | Facility: CLINIC | Age: 76
End: 2021-10-20

## 2021-10-20 LAB
25(OH)D3+25(OH)D2 SERPL-MCNC: 36.1 NG/ML (ref 30–100)
ALBUMIN SERPL-MCNC: 4.5 G/DL (ref 3.7–4.7)
ALBUMIN/GLOB SERPL: 1.7 {RATIO} (ref 1.2–2.2)
ALP SERPL-CCNC: 81 IU/L (ref 44–121)
ALT SERPL-CCNC: 22 IU/L (ref 0–44)
AST SERPL-CCNC: 25 IU/L (ref 0–40)
BASOPHILS # BLD AUTO: 0.1 X10E3/UL (ref 0–0.2)
BASOPHILS NFR BLD AUTO: 1 %
BILIRUB SERPL-MCNC: 0.5 MG/DL (ref 0–1.2)
BUN SERPL-MCNC: 16 MG/DL (ref 8–27)
BUN/CREAT SERPL: 14 (ref 10–24)
CALCIUM SERPL-MCNC: 9.3 MG/DL (ref 8.6–10.2)
CHLORIDE SERPL-SCNC: 99 MMOL/L (ref 96–106)
CHOLEST SERPL-MCNC: 188 MG/DL (ref 100–199)
CO2 SERPL-SCNC: 24 MMOL/L (ref 20–29)
CREAT SERPL-MCNC: 1.14 MG/DL (ref 0.76–1.27)
EOSINOPHIL # BLD AUTO: 0.1 X10E3/UL (ref 0–0.4)
EOSINOPHIL NFR BLD AUTO: 2 %
ERYTHROCYTE [DISTWIDTH] IN BLOOD BY AUTOMATED COUNT: 13.1 % (ref 11.6–15.4)
GLOBULIN SER CALC-MCNC: 2.6 G/DL (ref 1.5–4.5)
GLUCOSE SERPL-MCNC: 93 MG/DL (ref 65–99)
HBA1C MFR BLD: 5.4 % (ref 4.8–5.6)
HCT VFR BLD AUTO: 42.2 % (ref 37.5–51)
HDLC SERPL-MCNC: 33 MG/DL
HGB BLD-MCNC: 14.3 G/DL (ref 13–17.7)
IMM GRANULOCYTES # BLD AUTO: 0 X10E3/UL (ref 0–0.1)
IMM GRANULOCYTES NFR BLD AUTO: 0 %
LAB CORP EGFR IF AFRICN AM: 72 ML/MIN/1.73
LAB CORP EGFR IF NONAFRICN AM: 63 ML/MIN/1.73
LDLC SERPL CALC-MCNC: 113 MG/DL (ref 0–99)
LYMPHOCYTES # BLD AUTO: 1.7 X10E3/UL (ref 0.7–3.1)
LYMPHOCYTES NFR BLD AUTO: 25 %
MCH RBC QN AUTO: 29.2 PG (ref 26.6–33)
MCHC RBC AUTO-ENTMCNC: 33.9 G/DL (ref 31.5–35.7)
MCV RBC AUTO: 86 FL (ref 79–97)
MONOCYTES # BLD AUTO: 0.8 X10E3/UL (ref 0.1–0.9)
MONOCYTES NFR BLD AUTO: 12 %
NEUTROPHILS # BLD AUTO: 4 X10E3/UL (ref 1.4–7)
NEUTROPHILS NFR BLD AUTO: 60 %
PLATELET # BLD AUTO: 362 X10E3/UL (ref 150–450)
POTASSIUM SERPL-SCNC: 4.2 MMOL/L (ref 3.5–5.2)
PROT SERPL-MCNC: 7.1 G/DL (ref 6–8.5)
RBC # BLD AUTO: 4.89 X10E6/UL (ref 4.14–5.8)
SODIUM SERPL-SCNC: 137 MMOL/L (ref 134–144)
TRIGL SERPL-MCNC: 241 MG/DL (ref 0–149)
TSH SERPL DL<=0.005 MIU/L-ACNC: 0.87 UIU/ML (ref 0.45–4.5)
VLDLC SERPL CALC-MCNC: 42 MG/DL (ref 5–40)
WBC # BLD AUTO: 6.7 X10E3/UL (ref 3.4–10.8)

## 2021-10-20 NOTE — TELEPHONE ENCOUNTER
Left a detailed message with the patient regarding the test results that his cholesterol numbers are improved his A1c is also improved liver kidney tests are within normal range  He is advised to continue to work on his diet and lifestyle changes to improve the cholesterol even further   results are available on the patient portal and he can call me back if he has any questions

## 2022-04-23 ENCOUNTER — NURSE TRIAGE (OUTPATIENT)
Dept: FAMILY MEDICINE | Facility: CLINIC | Age: 77
End: 2022-04-23
Payer: COMMERCIAL

## 2022-04-23 NOTE — TELEPHONE ENCOUNTER
"Spoke to patient - reports he is COVID positive as of this AM. His wife tested positive this past Thursday. His symptoms are very mild (slight dry cough, sore throat and congestion). No fevers or chills. Patient educated on worsening symptoms and when to call back/seek help.     Reason for Disposition  • [1] COVID-19 diagnosed by positive lab test (e.g., PCR, rapid self-test kit) AND [2] mild symptoms (e.g., cough, fever, others) AND [3] no complications or SOB    Answer Assessment - Initial Assessment Questions  1. COVID-19 DIAGNOSIS: \"Who made your COVID-19 diagnosis?\" \"Was it confirmed by a positive lab test or self-test?\" If not diagnosed by a doctor (or NP/PA), ask \"Are there lots of cases (community spread) where you live?\" Note: See [facility] health department website, if unsure.      Self-test  2. COVID-19 EXPOSURE: \"Was there any known exposure to COVID before the symptoms began?\" CDC Definition of close contact: within 6 feet (2 meters) for a total of 15 minutes or more over a 24-hour period.       Wife tested positive on Thursday night  3. ONSET: \"When did the COVID-19 symptoms start?\"       This morning - nasal congestion and sore throat   4. WORST SYMPTOM: \"What is your worst symptom?\" (e.g., cough, fever, shortness of breath, muscle aches)      congestion  5. COUGH: \"Do you have a cough?\" If Yes, ask: \"How bad is the cough?\"        Slight dry cough  6. FEVER: \"Do you have a fever?\" If Yes, ask: \"What is your temperature, how was it measured, and when did it start?\"      no  7. RESPIRATORY STATUS: \"Describe your breathing?\" (e.g., shortness of breath, wheezing, unable to speak)       no  8. BETTER-SAME-WORSE: \"Are you getting better, staying the same or getting worse compared to yesterday?\"  If getting worse, ask, \"In what way?\"      Symptoms just began this AM  9. HIGH RISK DISEASE: \"Do you have any chronic medical problems?\" (e.g., asthma, heart or lung disease, weak immune system, obesity, etc.)      " "no  10. VACCINE: \"Have you had the COVID-19 vaccine?\" If Yes, ask: \"Which one, how many shots, when did you get it?\"        yes  11. BOOSTER: \"Have you received your COVID-19 booster?\" If Yes, ask: \"Which one and when did you get it?\"        October 1st, 2021 - Pfizer  12. PREGNANCY: \"Is there any chance you are pregnant?\" \"When was your last menstrual period?\"        n/a  13. OTHER SYMPTOMS: \"Do you have any other symptoms?\"  (e.g., chills, fatigue, headache, loss of smell or taste, muscle pain, sore throat)        Some sweats and sore throat   14. O2 SATURATION MONITOR:  \"Do you use an oxygen saturation monitor (pulse oximeter) at home?\" If Yes, ask \"What is your reading (oxygen level) today?\" \"What is your usual oxygen saturation reading?\" (e.g., 95%)        no    Protocols used: CORONAVIRUS (COVID-19) DIAGNOSED OR SUSPECTED-ADULT-AH      "

## 2022-11-10 ENCOUNTER — OFFICE VISIT (OUTPATIENT)
Dept: INTERNAL MEDICINE | Facility: CLINIC | Age: 77
End: 2022-11-10
Payer: COMMERCIAL

## 2022-11-10 ENCOUNTER — TELEPHONE (OUTPATIENT)
Dept: INTERNAL MEDICINE | Facility: CLINIC | Age: 77
End: 2022-11-10

## 2022-11-10 VITALS
SYSTOLIC BLOOD PRESSURE: 126 MMHG | TEMPERATURE: 98.2 F | BODY MASS INDEX: 26.36 KG/M2 | HEART RATE: 68 BPM | DIASTOLIC BLOOD PRESSURE: 66 MMHG | WEIGHT: 194.6 LBS | OXYGEN SATURATION: 97 % | HEIGHT: 72 IN | RESPIRATION RATE: 16 BRPM

## 2022-11-10 DIAGNOSIS — J18.9 PNEUMONIA OF BOTH LUNGS DUE TO INFECTIOUS ORGANISM, UNSPECIFIED PART OF LUNG: Primary | ICD-10-CM

## 2022-11-10 DIAGNOSIS — J18.9 PNEUMONIA OF BOTH LUNGS DUE TO INFECTIOUS ORGANISM, UNSPECIFIED PART OF LUNG: ICD-10-CM

## 2022-11-10 PROCEDURE — 3008F BODY MASS INDEX DOCD: CPT | Performed by: INTERNAL MEDICINE

## 2022-11-10 PROCEDURE — 99213 OFFICE O/P EST LOW 20 MIN: CPT | Performed by: INTERNAL MEDICINE

## 2022-11-10 RX ORDER — AMOXICILLIN AND CLAVULANATE POTASSIUM 875; 125 MG/1; MG/1
1 TABLET, FILM COATED ORAL 2 TIMES DAILY
Qty: 14 TABLET | Refills: 0 | Status: SHIPPED | OUTPATIENT
Start: 2022-11-10 | End: 2022-11-17

## 2022-11-10 RX ORDER — ALBUTEROL SULFATE 90 UG/1
2 INHALANT RESPIRATORY (INHALATION) EVERY 6 HOURS PRN
Qty: 18 G | Refills: 1 | Status: SHIPPED | OUTPATIENT
Start: 2022-11-10 | End: 2023-02-23

## 2022-11-10 RX ORDER — ALBUTEROL SULFATE 90 UG/1
2 INHALANT RESPIRATORY (INHALATION) EVERY 6 HOURS PRN
Qty: 18 G | Refills: 1 | Status: SHIPPED | OUTPATIENT
Start: 2022-11-10 | End: 2022-11-10 | Stop reason: SDUPTHER

## 2022-11-10 ASSESSMENT — ENCOUNTER SYMPTOMS
ALLERGIC/IMMUNOLOGIC NEGATIVE: 1
MUSCULOSKELETAL NEGATIVE: 1
RESPIRATORY NEGATIVE: 1
GASTROINTESTINAL NEGATIVE: 1
HEMATOLOGIC/LYMPHATIC NEGATIVE: 1
ENDOCRINE NEGATIVE: 1
PSYCHIATRIC NEGATIVE: 1
CARDIOVASCULAR NEGATIVE: 1
NEUROLOGICAL NEGATIVE: 1
CONSTITUTIONAL NEGATIVE: 1
EYES NEGATIVE: 1

## 2022-11-10 NOTE — PROGRESS NOTES
SUBJECTIVE:   77 y.o. male for a same day visit    URI: about 5 days ago he developed cold sx and a dry cough this then developed into more nasal congestion and productive cough.  No pain or pressure in the sinuses.  No fevers or chills.  No covid test.  No shortness of breath.  He has been taking nothing to make himself feel better.      Past Medical History:   Diagnosis Date    Colon polyps     Hemangioma of liver     History of Brito's esophagus     Hypertension     Inguinal hernia     Lipid disorder      Patient Active Problem List   Diagnosis    Acute sinusitis    Hypertension    Gastroesophageal reflux disease    Lightheadedness    Abdominal pain    Hyperglycemia    Lipid disorder    Aortic valve sclerosis    Inguinal hernia    Vitamin D deficiency     Social History     Tobacco Use    Smoking status: Never    Smokeless tobacco: Never   Vaping Use    Vaping Use: Never used   Substance Use Topics    Alcohol use: Yes    Drug use: Never       Current Outpatient Medications:     albuterol HFA (VENTOLIN HFA) 90 mcg/actuation inhaler, Inhale 2 puffs every 6 (six) hours as needed for wheezing., Disp: 18 g, Rfl: 1    amoxicillin-pot clavulanate (AUGMENTIN) 875-125 mg per tablet, Take 1 tablet by mouth 2 (two) times a day for 7 days., Disp: 14 tablet, Rfl: 0   No Known Allergies    Review of Systems   Constitutional: Negative.    HENT: Negative.    Eyes: Negative.    Respiratory: Negative.    Cardiovascular: Negative.    Gastrointestinal: Negative.    Endocrine: Negative.    Genitourinary: Negative.    Musculoskeletal: Negative.    Skin: Negative.    Allergic/Immunologic: Negative.    Neurological: Negative.    Hematological: Negative.    Psychiatric/Behavioral: Negative.         OBJECTIVE:  Visit Vitals  /66   Pulse 68   Temp 36.8 °C (98.2 °F) (Temporal)   Resp 16   Ht 1.829 m (6')   Wt 88.3 kg (194 lb 9.6 oz)   SpO2 97%   BMI 26.39 kg/m²      Gait:  Normal  Alert and well appearing    Awake and oriented with normal affect and appropriate behavior   HEENT: Normocephalic and atraumatic, pupils equal, round, OP clear with moist mucous membranes, nasal congestion b/l  Neck:  supple, no thyroid enlargement, no LAD  Lungs: fair air movement, scattered wheezes and rhonchi  Heart:Regular rate rhythm with no murmurs, gallops or rubs       Assessment/Plan     1. Pneumonia of both lungs due to infectious organism, unspecified part of lung  Given the duration and trajectory of his sx as well as his physical exam findings, will treat for pneumonia   - amoxicillin-pot clavulanate (AUGMENTIN) 875-125 mg per tablet; Take 1 tablet by mouth 2 (two) times a day for 7 days.  Dispense: 14 tablet; Refill: 0  - albuterol HFA (VENTOLIN HFA) 90 mcg/actuation inhaler; Inhale 2 puffs every 6 (six) hours as needed for wheezing.  Dispense: 18 g; Refill: 1  - call if not improving

## 2022-11-10 NOTE — PATIENT INSTRUCTIONS
You are suffering from a viral upper respiratory tract infection, for your nasal congestion please try over the counter flonase 1 spray in each nostril once daily as well as pseudoephedrine (purchased from behind the counter at the pharmacy, though prescription is not necessary) as needed for sinus pain and pressure, this will also likely help any sore throat and cough you are having as these are typically caused by post nasal drip.    For your cough please try robitussin DM or mucinex DM, these have a medication call dextromethrophan which is a very effective cough suppressant.    For your sore throat, try the interventions for nasal congestion above as well as cough drops, tea with honey and tylenol or NSAIDs such as advil or motrin as needed    If your symptoms continue to worsen and you are not feeling better in 1-2 weeks or if you develop high fever or shortness of breath please call to be re-evaluated

## 2022-11-16 ENCOUNTER — HOSPITAL ENCOUNTER (OUTPATIENT)
Facility: HOSPITAL | Age: 77
Setting detail: OBSERVATION
Discharge: HOME | End: 2022-11-17
Attending: EMERGENCY MEDICINE | Admitting: INTERNAL MEDICINE
Payer: COMMERCIAL

## 2022-11-16 ENCOUNTER — TELEPHONE (OUTPATIENT)
Dept: SCHEDULING | Facility: CLINIC | Age: 77
End: 2022-11-16
Payer: COMMERCIAL

## 2022-11-16 ENCOUNTER — APPOINTMENT (EMERGENCY)
Dept: RADIOLOGY | Facility: HOSPITAL | Age: 77
End: 2022-11-16
Payer: COMMERCIAL

## 2022-11-16 DIAGNOSIS — J32.0 MAXILLARY SINUSITIS, UNSPECIFIED CHRONICITY: ICD-10-CM

## 2022-11-16 DIAGNOSIS — U07.1 COVID-19: ICD-10-CM

## 2022-11-16 DIAGNOSIS — R55 SYNCOPE AND COLLAPSE: Primary | ICD-10-CM

## 2022-11-16 PROBLEM — E87.1 HYPONATREMIA: Status: ACTIVE | Noted: 2022-11-16

## 2022-11-16 PROBLEM — D72.829 LEUKOCYTOSIS: Status: ACTIVE | Noted: 2022-11-16

## 2022-11-16 PROBLEM — J01.90 ACUTE SINUSITIS: Chronic | Status: ACTIVE | Noted: 2019-01-14

## 2022-11-16 LAB
ANION GAP SERPL CALC-SCNC: 9 MEQ/L (ref 3–15)
BASOPHILS # BLD: 0.14 K/UL (ref 0.01–0.1)
BASOPHILS NFR BLD: 0.6 %
BUN SERPL-MCNC: 18 MG/DL (ref 8–20)
CALCIUM SERPL-MCNC: 9.1 MG/DL (ref 8.9–10.3)
CHLORIDE SERPL-SCNC: 96 MEQ/L (ref 98–109)
CO2 SERPL-SCNC: 26 MEQ/L (ref 22–32)
CREAT SERPL-MCNC: 1.3 MG/DL (ref 0.8–1.3)
DIFFERENTIAL METHOD BLD: ABNORMAL
EOSINOPHIL # BLD: 0.05 K/UL (ref 0.04–0.54)
EOSINOPHIL NFR BLD: 0.2 %
ERYTHROCYTE [DISTWIDTH] IN BLOOD BY AUTOMATED COUNT: 13 % (ref 11.6–14.4)
FLUAV RNA SPEC QL NAA+PROBE: NEGATIVE
FLUBV RNA SPEC QL NAA+PROBE: NEGATIVE
GFR SERPL CREATININE-BSD FRML MDRD: 53.7 ML/MIN/1.73M*2
GLUCOSE BLD-MCNC: 74 MG/DL (ref 70–99)
GLUCOSE SERPL-MCNC: 103 MG/DL (ref 70–99)
HCT VFR BLDCO AUTO: 42.2 % (ref 40.1–51)
HGB BLD-MCNC: 14 G/DL (ref 13.7–17.5)
IMM GRANULOCYTES # BLD AUTO: 0.15 K/UL (ref 0–0.08)
IMM GRANULOCYTES NFR BLD AUTO: 0.7 %
LYMPHOCYTES # BLD: 1.6 K/UL (ref 1.2–3.5)
LYMPHOCYTES NFR BLD: 7.3 %
MCH RBC QN AUTO: 28.9 PG (ref 28–33.2)
MCHC RBC AUTO-ENTMCNC: 33.2 G/DL (ref 32.2–36.5)
MCV RBC AUTO: 87 FL (ref 83–98)
MONOCYTES # BLD: 1.63 K/UL (ref 0.3–1)
MONOCYTES NFR BLD: 7.5 %
NEUTROPHILS # BLD: 18.28 K/UL (ref 1.7–7)
NEUTS SEG NFR BLD: 83.7 %
NRBC BLD-RTO: 0 %
PDW BLD AUTO: 9.1 FL (ref 9.4–12.4)
PLATELET # BLD AUTO: 418 K/UL (ref 150–350)
POCT TEST: NORMAL
POTASSIUM SERPL-SCNC: 3.7 MEQ/L (ref 3.6–5.1)
RBC # BLD AUTO: 4.85 M/UL (ref 4.5–5.8)
RSV RNA SPEC QL NAA+PROBE: NEGATIVE
SARS-COV-2 RNA RESP QL NAA+PROBE: POSITIVE
SODIUM SERPL-SCNC: 131 MEQ/L (ref 136–144)
TROPONIN I SERPL HS-MCNC: 4.1 PG/ML
TROPONIN I SERPL HS-MCNC: 5.5 PG/ML
WBC # BLD AUTO: 21.85 K/UL (ref 3.8–10.5)

## 2022-11-16 PROCEDURE — 85025 COMPLETE CBC W/AUTO DIFF WBC: CPT | Performed by: PHYSICIAN ASSISTANT

## 2022-11-16 PROCEDURE — 93005 ELECTROCARDIOGRAM TRACING: CPT | Performed by: PHYSICIAN ASSISTANT

## 2022-11-16 PROCEDURE — 70450 CT HEAD/BRAIN W/O DYE: CPT | Mod: ME

## 2022-11-16 PROCEDURE — G0378 HOSPITAL OBSERVATION PER HR: HCPCS

## 2022-11-16 PROCEDURE — 99285 EMERGENCY DEPT VISIT HI MDM: CPT | Mod: 25

## 2022-11-16 PROCEDURE — 99205 OFFICE O/P NEW HI 60 MIN: CPT | Mod: CR | Performed by: INTERNAL MEDICINE

## 2022-11-16 PROCEDURE — 96360 HYDRATION IV INFUSION INIT: CPT

## 2022-11-16 PROCEDURE — 63600000 HC DRUGS/DETAIL CODE: Performed by: INTERNAL MEDICINE

## 2022-11-16 PROCEDURE — 96361 HYDRATE IV INFUSION ADD-ON: CPT

## 2022-11-16 PROCEDURE — 63700000 HC SELF-ADMINISTRABLE DRUG: Performed by: INTERNAL MEDICINE

## 2022-11-16 PROCEDURE — 80048 BASIC METABOLIC PNL TOTAL CA: CPT | Performed by: PHYSICIAN ASSISTANT

## 2022-11-16 PROCEDURE — 87637 SARSCOV2&INF A&B&RSV AMP PRB: CPT | Performed by: EMERGENCY MEDICINE

## 2022-11-16 PROCEDURE — 25800000 HC PHARMACY IV SOLUTIONS: Performed by: PHYSICIAN ASSISTANT

## 2022-11-16 PROCEDURE — 96372 THER/PROPH/DIAG INJ SC/IM: CPT | Mod: 59

## 2022-11-16 PROCEDURE — 84484 ASSAY OF TROPONIN QUANT: CPT | Performed by: PHYSICIAN ASSISTANT

## 2022-11-16 PROCEDURE — 99220 PR INITIAL OBSERVATION CARE/DAY 70 MINUTES: CPT | Mod: CR | Performed by: INTERNAL MEDICINE

## 2022-11-16 PROCEDURE — 36415 COLL VENOUS BLD VENIPUNCTURE: CPT

## 2022-11-16 PROCEDURE — 84484 ASSAY OF TROPONIN QUANT: CPT | Mod: 91 | Performed by: PHYSICIAN ASSISTANT

## 2022-11-16 PROCEDURE — 71046 X-RAY EXAM CHEST 2 VIEWS: CPT

## 2022-11-16 PROCEDURE — 1123F ACP DISCUSS/DSCN MKR DOCD: CPT | Performed by: INTERNAL MEDICINE

## 2022-11-16 RX ORDER — ALUMINUM HYDROXIDE, MAGNESIUM HYDROXIDE, AND SIMETHICONE 1200; 120; 1200 MG/30ML; MG/30ML; MG/30ML
30 SUSPENSION ORAL EVERY 4 HOURS PRN
Status: DISCONTINUED | OUTPATIENT
Start: 2022-11-16 | End: 2022-11-17 | Stop reason: HOSPADM

## 2022-11-16 RX ORDER — ONDANSETRON HYDROCHLORIDE 2 MG/ML
4 INJECTION, SOLUTION INTRAVENOUS EVERY 8 HOURS PRN
Status: DISCONTINUED | OUTPATIENT
Start: 2022-11-16 | End: 2022-11-17 | Stop reason: HOSPADM

## 2022-11-16 RX ORDER — AMOXICILLIN AND CLAVULANATE POTASSIUM 875; 125 MG/1; MG/1
1 TABLET, FILM COATED ORAL 2 TIMES DAILY
Status: COMPLETED | OUTPATIENT
Start: 2022-11-16 | End: 2022-11-17

## 2022-11-16 RX ORDER — SENNOSIDES 8.6 MG/1
1 TABLET ORAL 2 TIMES DAILY PRN
Status: DISCONTINUED | OUTPATIENT
Start: 2022-11-16 | End: 2022-11-17 | Stop reason: HOSPADM

## 2022-11-16 RX ORDER — NITROGLYCERIN 0.4 MG/1
0.4 TABLET SUBLINGUAL EVERY 5 MIN PRN
Status: DISCONTINUED | OUTPATIENT
Start: 2022-11-16 | End: 2022-11-17 | Stop reason: HOSPADM

## 2022-11-16 RX ORDER — IBUPROFEN 200 MG
16-32 TABLET ORAL AS NEEDED
Status: DISCONTINUED | OUTPATIENT
Start: 2022-11-16 | End: 2022-11-17 | Stop reason: HOSPADM

## 2022-11-16 RX ORDER — ENOXAPARIN SODIUM 100 MG/ML
40 INJECTION SUBCUTANEOUS
Status: DISCONTINUED | OUTPATIENT
Start: 2022-11-16 | End: 2022-11-17 | Stop reason: HOSPADM

## 2022-11-16 RX ORDER — DEXTROSE 40 %
15-30 GEL (GRAM) ORAL AS NEEDED
Status: DISCONTINUED | OUTPATIENT
Start: 2022-11-16 | End: 2022-11-17 | Stop reason: HOSPADM

## 2022-11-16 RX ORDER — ALBUTEROL SULFATE 90 UG/1
2 INHALANT RESPIRATORY (INHALATION) EVERY 6 HOURS PRN
Status: DISCONTINUED | OUTPATIENT
Start: 2022-11-16 | End: 2022-11-17 | Stop reason: HOSPADM

## 2022-11-16 RX ORDER — ACETAMINOPHEN 325 MG/1
650 TABLET ORAL EVERY 4 HOURS PRN
Status: DISCONTINUED | OUTPATIENT
Start: 2022-11-16 | End: 2022-11-17 | Stop reason: HOSPADM

## 2022-11-16 RX ORDER — DEXTROSE 50 % IN WATER (D50W) INTRAVENOUS SYRINGE
25 AS NEEDED
Status: DISCONTINUED | OUTPATIENT
Start: 2022-11-16 | End: 2022-11-17 | Stop reason: HOSPADM

## 2022-11-16 RX ADMIN — SODIUM CHLORIDE 1000 ML: 9 INJECTION, SOLUTION INTRAVENOUS at 10:58

## 2022-11-16 RX ADMIN — ENOXAPARIN SODIUM 40 MG: 40 INJECTION SUBCUTANEOUS at 22:33

## 2022-11-16 RX ADMIN — AMOXICILLIN AND CLAVULANATE POTASSIUM 1 TABLET: 875; 125 TABLET, FILM COATED ORAL at 22:33

## 2022-11-16 ASSESSMENT — ENCOUNTER SYMPTOMS
SHORTNESS OF BREATH: 0
NAUSEA: 0
CHILLS: 0
VOMITING: 0
CHEST TIGHTNESS: 0
HEADACHES: 0

## 2022-11-16 NOTE — CONSULTS
[facility] Cardiology   Consult Note       Reason for consult: Syncope     HPI     Quang Carrera is a 77 y.o. male with a past medical history for Brito's esophagus. He presented to  for evaluation of recurrent syncope.     He saw a cardiologist in November of 2020 for the evaluation of lightheadedness.  He was reported to have a cardiac monitor which was normal along with an echocardiogram and stress test which were largely unremarkable. He was noted to previously have a murmur, and his echocardiogram was reported to show aortic sclerosis. Previous EKG was reported to show     He reports that at 5 AM this morning he had finished urinating and was walking back to bed when he felt lightheaded and woke up on the ground, in his fall he struck the back of his head.  He went to urgent care later and was getting staples placed, while sitting in a chair getting the staples placed he had a recurrent syncopal episode.     On arrival to the ED, his blood pressure was 125/65, HR 92, afebrile, 96% on room air. Labwork was remarkable for sodium 137, WBC 21.85, high-sensitivity troponins were negative x2.  He was found to be COVID-positive.  Chest x-ray showed no active disease, CT of the head showed no acute hemorrhage, mild posterior scalp swelling with staples and paranasal sinus disease. EKG showed sinus with 1st AV block, RBBB, possible old inferior infarct, and nonspecific ST-T wave changes.      On interview, he says he felt weak and dizzy this morning following urinating, he was unable to lower himself to the ground prior to his syncopal event. He denies any episodes of chest pain, palpitations, SOB/FOUNTAIN, or LE edema. He does not recall the syncopal event that occurred at urgent care, he recalls sitting in the chair getting staples placed and recalls waking up later.     ROS: As noted per HPI    Past History      Past Medical History:   Diagnosis Date    Colon polyps     Hemangioma  of liver     History of Brito's esophagus     Hypertension     Inguinal hernia     Lipid disorder        Past Surgical History:   Procedure Laterality Date    COLONOSCOPY      2016    EYE SURGERY         Social History     Social History Narrative    Not on file       Family History   Problem Relation Age of Onset    Brito's esophagus Biological Father         Medications     Medications prior to admission:  (Not in a hospital admission)      Scheduled Inpatient Medications:      Scheduled Inpatient Infusions:  No current facility-administered medications for this encounter.        Allergies     Patient has no known allergies.     Vital Signs/Exam     Vital signs in last 24 hours:  Temp:  [37.2 °C (99 °F)] 37.2 °C (99 °F)  Heart Rate:  [87-92] 87  Resp:  [16] 16  BP: (125-132)/(62-65) 132/62    I/O    Intake/Output Summary (Last 24 hours) at 11/16/2022 1320  Last data filed at 11/16/2022 1304  Gross per 24 hour   Intake 1000 ml   Output --   Net 1000 ml       Weight  Weight change:     Physical Exam:  Visit Vitals  /62   Pulse 87   Temp 37.2 °C (99 °F)   Resp 16   Ht 1.829 m (6')   Wt 87.5 kg (193 lb)   SpO2 96%   BMI 26.18 kg/m²       Gen: no distress  HEENT: no jvd, no carotid bruits   Lungs: clear bilaterally; no crackles, rhonchi, wheezing  Chest wall: no tenderness  Heart: regular rate and rhythm; no murmur   Abdomen: nondistended, nontender  Extremities: no edema  Neuro: nonfocal, alert and oriented  Psych: normal mood and affect     Diagnostic Data   Diagnostic data personally reviewed.    Labs:  Results from last 7 days   Lab Units 11/16/22  1054   WBC K/uL 21.85*   HEMOGLOBIN g/dL 14.0   HEMATOCRIT % 42.2   PLATELETS K/uL 418*     Results from last 7 days   Lab Units 11/16/22  1054   SODIUM mEQ/L 131*   POTASSIUM mEQ/L 3.7   CHLORIDE mEQ/L 96*   CO2 mEQ/L 26   BUN mg/dL 18   CREATININE mg/dL 1.3   GLUCOSE mg/dL 103*   CALCIUM mg/dL 9.1         High Sens Troponin I   Date Value Ref Range  Status   11/16/2022 4.1 <15.0 pg/mL Final                               Imaging last 24 hrs:   No results found.    Vascular Studies:  No results found for this or any previous visit from the past 365 days.      Peripheral:  No results found for this or any previous visit from the past 365 days.      Stress Tests:             Cardiac cath:      Echocardiogram:      ECG: Independently reviewed: sinus with 1st AV block, RBBB, possible old inferior infarct, and nonspecific ST-T wave changes.    Telemetry: Independently reviewed: Sinus with first degree AV block        Assessment and Plan    CODE STATUS: No Order    There are no hospital problems to display for this patient.      Quang Carrera is a 77 y.o. male with a past medical history for Brito's esophagus. He presented to [facility] for evaluation of recurrent syncope.     Syncope and Collapse   -He had previously been seen a cardiologist in 2020, he had an echocardiogram, stress test and cardiac which were reported to be largely unremarkable.  -Presented with two syncopal episodes, one after urinating resulting in a fall with scalp laceration and one episode as he was getting staples placed for the scalp laceration   -CT of the head showed no acute hemorrhage, mild posterior scalp swelling with staples and paranasal sinus disease  -Will monitor on telemetry, at discharge would recommend an outpatient cardiac monitor   -Will check an echocardiogram     Covid 19 Infection   -Reports 6 days of ongoing upper respiratory symptoms  -Tested positive for COVID-19 currently on arrival in the ED  -WBC 21.85, chest x-ray showed no active disease   -Further management per [facility] recommendation     Thank you for this consultation. We will continue to follow this patient with you.        Patient was seen and evaluated in conjunction with Dr. Michael Valentino Caroline Schon, CRNP    [facility] Cardiology, Main Office: [phone]  Primary [facility]: [place]  [facility]   Pager #[phone]  11/16/2022

## 2022-11-16 NOTE — H&P
Hospital Medicine Service -  History & Physical        CHIEF COMPLAINT     2 episodes of syncope     HISTORY OF PRESENT ILLNESS        Quang Carrera is a 77 y.o. male with a history of hypertension, hyperlipidemia, hemangioma along with other conditions as below presents with concerns for the above symptoms.    States that he got out of bed to go to the bathroom and felt slightly lightheaded when he stood.  He later then went to void and the next thing he knew he was being on the floor.  Wife heard the sound and went up to check on him.  He was not responding initially but within less than a minute he started responding and was able to get up.    Went to urgent care to get sutures as he had hit his head and had scalp injury.  When he was getting sutures seems like he had another episode where he lost his consciousness briefly.  He was sitting at that time.  He denied any chest pain, palpitations or shortness of breath surrounding this episode.  Denies any similar symptoms in the past.      He states that he was not eating and drinking much and his appetite taste were gone. He also has been having cough and nasal congestion greenish discharge from his nose for the last 6 days.  He was diagnosed with acute sinusitis and colitis, and initiated antibiotics.  States that his cough is getting better and his sinus symptoms have been getting better as well.      During all this time he did not have any symptoms of fevers, chills, sore throat, difficulty breathing, myalgias.  He was also not tested for COVID during this time.  His last booster dose was around a month ago October 19, 2022.    He denies any abdominal pain, nausea, vomiting with diarrhea or constipation, new urinary symptoms, or new joint pains or rashes as well. Has had Holter monitors in the past but was more related to lightheadedness.     PAST MEDICAL AND SURGICAL HISTORY        Past Medical History:   Diagnosis Date    Colon polyps     Hemangioma of  liver     History of Brito's esophagus     Hypertension     Inguinal hernia     Lipid disorder        Past Surgical History:   Procedure Laterality Date    COLONOSCOPY      2016    EYE SURGERY         MEDICATIONS        Prior to Admission medications    Medication Sig Start Date End Date Taking? Authorizing Provider   albuterol HFA (VENTOLIN HFA) 90 mcg/actuation inhaler Inhale 2 puffs every 6 (six) hours as needed for wheezing. 11/10/22 12/10/22  Julio Cesar Roman MD   amoxicillin-pot clavulanate (AUGMENTIN) 875-125 mg per tablet Take 1 tablet by mouth 2 (two) times a day for 7 days. 11/10/22 11/17/22  Julio Cesar Roman MD       ALLERGIES        Patient has no known allergies.    FAMILY HISTORY        Family History   Problem Relation Age of Onset    Brito's esophagus Biological Father        SOCIAL HISTORY        Social History     Socioeconomic History    Marital status:      Spouse name: None    Number of children: None    Years of education: None    Highest education level: None   Tobacco Use    Smoking status: Never    Smokeless tobacco: Never   Vaping Use    Vaping Use: Never used   Substance and Sexual Activity    Alcohol use: Yes    Drug use: Never    Sexual activity: Defer     Social Determinants of Health     Food Insecurity: No Food Insecurity    Worried About Running Out of Food in the Last Year: Never true    Ran Out of Food in the Last Year: Never true       REVIEW OF SYSTEMS      All other systems reviewed and negative except as noted in HPI    PHYSICAL EXAMINATION        Visit Vitals  /62   Pulse 87   Temp 37.2 °C (99 °F)   Resp 16   Ht 1.829 m (6')   Wt 87.5 kg (193 lb)   SpO2 96%   BMI 26.18 kg/m²     Body mass index is 26.18 kg/m².  Physical Exam  Constitutional:       Appearance: He is normal weight.   HENT:      Head: Normocephalic and atraumatic.      Nose: No congestion.      Mouth/Throat:      Mouth: Mucous membranes are dry.   Eyes:      Extraocular  Movements: Extraocular movements intact.      Pupils: Pupils are equal, round, and reactive to light.   Cardiovascular:      Rate and Rhythm: Normal rate and regular rhythm.      Pulses: Normal pulses.      Heart sounds: Normal heart sounds.   Pulmonary:      Effort: Pulmonary effort is normal.      Comments: Bronchial breath sounds  Abdominal:      Palpations: Abdomen is soft.      Tenderness: There is no abdominal tenderness.   Musculoskeletal:      Cervical back: Neck supple.      Right lower leg: No edema.      Left lower leg: No edema.   Skin:     General: Skin is warm and dry.   Neurological:      General: No focal deficit present.      Mental Status: He is alert and oriented to person, place, and time.      Cranial Nerves: No cranial nerve deficit.      Sensory: No sensory deficit.      Motor: No weakness.         LABS / IMAGING / EKG        Labs  Results from last 7 days   Lab Units 11/16/22  1054   WBC K/uL 21.85*   HEMOGLOBIN g/dL 14.0   HEMATOCRIT % 42.2   PLATELETS K/uL 418*     Results from last 7 days   Lab Units 11/16/22  1054   SODIUM mEQ/L 131*   POTASSIUM mEQ/L 3.7   CHLORIDE mEQ/L 96*   CO2 mEQ/L 26   BUN mg/dL 18   CREATININE mg/dL 1.3   GLUCOSE mg/dL 103*   CALCIUM mg/dL 9.1       Imaging  CT head without IV contrast  Findings:  Ventricles and sulci are prominent, likely compatible with age-related cerebral  volume loss. No acute intracranial hemorrhage, extra axial collection, mass  effect or edema. No CT evidence of large acute territorial infarction. Patchy  periventricular and subcortical white matter hypoattenuation, which is  nonspecific, however likely compatible with chronic microangiopathy.  Intracranial vascular calcifications at the skull base. Mild-moderate mucosal  thickening in the paranasal sinuses with layering fluid levels in the maxillary  sinuses, which can be seen in the clinical setting of acute sinusitis. The  calvarium is intact. Bilateral lens replacements.  Mild  posterior scalp swelling  with staples.     --  IMPRESSION:  1.  No acute intracranial hemorrhage or mass-effect.  Mild posterior scalp  swelling with staples.  2.  Paranasal sinus disease, which can be seen in the clinical setting of acute  sinusitis.    ECG/Telemetry  I have independently reviewed the ECG. Significant findings include Sinus rhythm, 93 bpm, first-degree AV block, right bundle branch block nonspecific ST-T changes.    ASSESSMENT AND PLAN           * Syncope and collapse  Assessment & Plan  New onset some decreased input and lightheadedness prior to the initial event though he was sitting with a second event  Will obtain orthostatics to be complete  Currently without any significant EKG changes concerning for the episode  Elevated by cardiology-appreciate input  Echocardiogram requested  To be monitored overnight on telemetry    Hyponatremia  Assessment & Plan  Mild hyponatremia with hypochloremia  Decreased intake per patient, possibly related to volume depletion  Low IV fluids and reassess    Leukocytosis  Assessment & Plan  Currently with improving symptoms of cough and also sinusitis per patient  On antibiotics  No other infectious etiology noted at this time  No urinary symptoms or abnormal  Possibly reactive  Continue to monitor    COVID-19 virus infection  Assessment & Plan  His cough could be related to the COVID-19 infection  Endorses that his symptoms have been improving  His pulse ox has been stable per patient  Currently without any fevers or chills  We will continue to monitor and provide supportive care as needed    Acute sinusitis  Assessment & Plan  Currently on oral antibiotics  Can be continued to complete the course       VTE Assessment: [place] VTE Score: 5  Code Status: Full Code  Estimated discharge date: 11/17/2022     Krystyna Gupta MD  11/16/2022  3:12 PM

## 2022-11-16 NOTE — ASSESSMENT & PLAN NOTE
His cough could be related to the COVID-19 infection  Endorses that his symptoms have been improving  His pulse ox has been stable per patient  Currently without any fevers or chills  We will continue to monitor and provide supportive care as needed

## 2022-11-16 NOTE — ED PROVIDER NOTES
Emergency Medicine Note  HPI   HISTORY OF PRESENT ILLNESS     Patient reports this morning he was urinating about 5 AM he just finished he had been standing was started to walk back to his bed he felt lightheaded awoke on the ground.  He apparently passed out striking the back of his head.  Patient reports that he went back to bed.  Then awoke later in the morning went to urgent care.  Patient states while seated at urgent care without prodrome he was getting staples placed to his scalp laceration he passed out again.  Patient denies any headache nauseous or vomiting denies any chest pain heaviness or pressure.    Patient states he has been treated for bronchitis he is on Augmentin.  He denies any neck pain numbness or tingling to his extremities denies any abdominal pain nauseous vomiting or diarrhea.            Patient History   PAST HISTORY     Reviewed from Nursing Triage:  Tobacco  Allergies  Meds  Problems  Med Hx  Surg Hx  Fam Hx  Soc   Hx      Past Medical History:   Diagnosis Date    Colon polyps     Hemangioma of liver     History of Brito's esophagus     Hypertension     Inguinal hernia     Lipid disorder        Past Surgical History:   Procedure Laterality Date    COLONOSCOPY      2016    EYE SURGERY         Family History   Problem Relation Age of Onset    Brito's esophagus Biological Father        Social History     Tobacco Use    Smoking status: Never    Smokeless tobacco: Never   Vaping Use    Vaping Use: Never used   Substance Use Topics    Alcohol use: Yes    Drug use: Never         Review of Systems   REVIEW OF SYSTEMS     Review of Systems   Constitutional: Negative for chills.   Respiratory: Negative for chest tightness and shortness of breath.    Cardiovascular: Negative for chest pain.   Gastrointestinal: Negative for nausea and vomiting.   Neurological: Negative for headaches.         VITALS     ED Vitals    Date/Time Temp Pulse Resp BP SpO2 [facility]   11/16/22 1806 37.4  °C (99.3 °F) 88 16 157/67 94 % MAURY   11/16/22 1602 -- 108 -- 157/74 97 % SS   11/16/22 1559 -- 90 16 141/54 -- SS   11/16/22 1304 -- 87 16 132/62 96 % MAURY   11/16/22 1054 37.2 °C (99 °F) -- -- -- -- MEM   11/16/22 1047 -- 92 16 125/65 96 % MEM        Pulse Ox %: 98 % (11/16/22 1356)  Pulse Ox Interpretation: Normal (11/16/22 1356)  Heart Rate: 89 (11/16/22 1356)  Rhythm Strip Interpretation: Normal Sinus Rhythm (11/16/22 1356)     Physical Exam   PHYSICAL EXAM     Physical Exam  Vitals and nursing note reviewed.   HENT:      Head: Normocephalic and atraumatic.   Eyes:      Conjunctiva/sclera: Conjunctivae normal.   Cardiovascular:      Rate and Rhythm: Normal rate and regular rhythm.      Heart sounds: No murmur heard.  Pulmonary:      Effort: Pulmonary effort is normal.      Breath sounds: Normal breath sounds. No rales.   Abdominal:      General: Bowel sounds are normal.      Palpations: Abdomen is soft.   Neurological:      General: No focal deficit present.      Mental Status: He is alert and oriented to person, place, and time.   Psychiatric:         Mood and Affect: Mood normal.           PROCEDURES     Procedures     DATA     Results     Procedure Component Value Units Date/Time    SARS-CoV-2 (COVID-19), PCR Nasopharynx [[id number]]  (Abnormal) Collected: 11/16/22 1107    Specimen: Nasopharyngeal Swab from Nasopharynx Updated: 11/16/22 1159    Narrative:      The following orders were created for panel order SARS-CoV-2 (COVID-19), PCR Nasopharynx.  Procedure                               Abnormality         Status                     ---------                               -----------         ------                     SARS-COV-2 (COVID-19)/ F...[[id number]]  Abnormal            Final result                 Please view results for these tests on the individual orders.    SARS-COV-2 (COVID-19)/ FLU A/B, AND RSV, PCR Nasopharynx [[id number]]  (Abnormal) Collected: 11/16/22 1107    Specimen: Nasopharyngeal Swab  from Nasopharynx Updated: 11/16/22 1159     SARS-CoV-2 (COVID-19) Positive     Influenza A Negative     Influenza B Negative     Respiratory Syncytial Virus Negative    Narrative:      Testing performed using real-time PCR for detection of COVID-19. EUA approved validation studies performed on site.     HS Troponin I (with 2 hour reflex) [[id number]]  (Normal) Collected: 11/16/22 1054    Specimen: Blood, Venous Updated: 11/16/22 1145     High Sens Troponin I 4.1 pg/mL     Basic metabolic panel [[id number]]  (Abnormal) Collected: 11/16/22 1054    Specimen: Blood, Venous Updated: 11/16/22 1128     Sodium 131 mEQ/L      Potassium 3.7 mEQ/L      Comment: Results obtained on plasma. Plasma Potassium values may be up to 0.4 mEQ/L less than serum values. The differences may be greater for patients with high platelet or white cell counts.        Chloride 96 mEQ/L      CO2 26 mEQ/L      BUN 18 mg/dL      Creatinine 1.3 mg/dL      Glucose 103 mg/dL      Calcium 9.1 mg/dL      eGFR 53.7 mL/min/1.73m*2      Anion Gap 9 mEQ/L     CBC and differential [[id number]]  (Abnormal) Collected: 11/16/22 1054    Specimen: Blood, Venous Updated: 11/16/22 1107     WBC 21.85 K/uL      RBC 4.85 M/uL      Hemoglobin 14.0 g/dL      Hematocrit 42.2 %      MCV 87.0 fL      MCH 28.9 pg      MCHC 33.2 g/dL      RDW 13.0 %      Platelets 418 K/uL      MPV 9.1 fL      Differential Type Auto     nRBC 0.0 %      Immature Granulocytes 0.7 %      Neutrophils 83.7 %      Lymphocytes 7.3 %      Monocytes 7.5 %      Eosinophils 0.2 %      Basophils 0.6 %      Immature Granulocytes, Absolute 0.15 K/uL      Neutrophils, Absolute 18.28 K/uL      Lymphocytes, Absolute 1.60 K/uL      Monocytes, Absolute 1.63 K/uL      Eosinophils, Absolute 0.05 K/uL      Basophils, Absolute 0.14 K/uL     RAINBOW LT BLUE [[id number]] Collected: 11/16/22 1056    Specimen: Blood, Venous Updated: 11/16/22 1103    [place] Draw Panel [[id number]] Collected: 11/16/22 1056    Specimen:  Blood, Venous Updated: 11/16/22 1103    Narrative:      The following orders were created for panel order [place] Draw Panel.  Procedure                               Abnormality         Status                     ---------                               -----------         ------                     RAINBOW PINK[[id number]]                                     In process                 ALISSA STANFORD BLUE[[id number]]                                  In process                   Please view results for these tests on the individual orders.    RAINBOW PINK [[id number]] Collected: 11/16/22 1056    Specimen: Blood, Venous Updated: 11/16/22 1103          Imaging Results          CT HEAD WITHOUT IV CONTRAST (Final result)  Result time 11/16/22 11:51:45    Final result                 Impression:    IMPRESSION:  1.  No acute intracranial hemorrhage or mass-effect.  Mild posterior scalp  swelling with staples.  2.  Paranasal sinus disease, which can be seen in the clinical setting of acute  sinusitis.             Narrative:    CLINICAL HISTORY:  Head trauma, minor.    COMMENT:    Comparison: None.    Technique: CT examination of the brain was performed without contrast. Sagittal  and coronal reconstructions were obtained. CT DOSE:  One or more dose reduction  techniques (e.g. automated exposure control, adjustment of the mA and/or kV  according to patient size, use of iterative reconstruction technique) was  utilized for this examination.    Findings:  Ventricles and sulci are prominent, likely compatible with age-related cerebral  volume loss. No acute intracranial hemorrhage, extra axial collection, mass  effect or edema. No CT evidence of large acute territorial infarction. Patchy  periventricular and subcortical white matter hypoattenuation, which is  nonspecific, however likely compatible with chronic microangiopathy.  Intracranial vascular calcifications at the skull base. Mild-moderate mucosal  thickening in the paranasal  sinuses with layering fluid levels in the maxillary  sinuses, which can be seen in the clinical setting of acute sinusitis. The  calvarium is intact. Bilateral lens replacements.  Mild posterior scalp swelling  with staples.                               X-RAY CHEST 2 VIEWS (Final result)  Result time 11/16/22 11:38:29    Final result                 Impression:    IMPRESSION: No definite active disease.                 Narrative:    CLINICAL HISTORY: Cough    PRIOR STUDY: Chest x-ray dated August 15, 2011    TECHNIQUE: AP and lateral image of the chest    COMMENT:  The lungs are clear. The heart size is normal. There is thoracic  degenerative change.                                ECG 12 lead   ED Interpretation   Sinus 93 bt/minwith first-degree block right bundle branch block nonspecific ST-T wave changes EKG read by attending                Scoring tools                                  ED Course & MDM   MDM / ED COURSE / CLINICAL IMPRESSION / DISPO     MDM    ED Course as of 11/16/22 1847 Wed Nov 16, 2022   1151 High Sens Troponin I: 4.1 [JR]   1151 Narrative & Impression  CLINICAL HISTORY: Cough     PRIOR STUDY: Chest x-ray dated August 15, 2011     TECHNIQUE: AP and lateral image of the chest     COMMENT:  The lungs are clear. The heart size is normal. There is thoracic  degenerative change.     --  IMPRESSION: No definite active disease. [JR]   1201 SARS-CoV-2 (COVID-19)(!): Positive [HL]   1201 WBC(!): 21.85 [HL]   1202 CT HEAD WITHOUT IV CONTRAST  IMPRESSION:  1.  No acute intracranial hemorrhage or mass-effect.  Mild posterior scalp  swelling with staples.  2.  Paranasal sinus disease, which can be seen in the clinical setting of acute  sinusitis.        Specimen Collected: 11/16/22 11:48         [HL]   1202 High Sens Troponin I: 4.1  1st [HL]   1202 SARS-CoV-2 (COVID-19)(!): Positive [JR]   1202 High Sens Troponin I: 4.1 [JR]   1207 Discussed the patient regarding his COVID status.  States he has had a  cough for a week thought was bronchitis has been treated for Augmentin.  Augmentin does cover his sinus disease.  Given no firm onset no utility in giving Paxlovid [JR]   1207 Discussed with patient regarding his leukocytosis.  We will need repeat CBC next week [JR]      ED Course User Index  [HL] Hua Zacarias MD  [JR] Alonzo Santa PA C     Clinical Impression      Syncope and collapse   Maxillary sinusitis, unspecified chronicity   COVID-19     _________________     ED Disposition   Admit / Observation                   Alonzo Santa PA C  11/16/22 7535

## 2022-11-16 NOTE — ASSESSMENT & PLAN NOTE
Currently with improving symptoms of cough and also sinusitis per patient  On antibiotics  No other infectious etiology noted at this time  No urinary symptoms or abnormal  Possibly reactive  Continue to monitor

## 2022-11-16 NOTE — ASSESSMENT & PLAN NOTE
New onset some decreased input and lightheadedness prior to the initial event though he was sitting with a second event  Will obtain orthostatics to be complete  Currently without any significant EKG changes concerning for the episode  Elevated by cardiology-appreciate input  Echocardiogram requested  To be monitored overnight on telemetry

## 2022-11-16 NOTE — TELEPHONE ENCOUNTER
Doctor to Doctor     Name of caller: Tamanna Giron RN w/  [facility] ED    Is this an emergency: urgent     Doctors full name: Alonzo Santa PA-C    Doctors call back number: [phone]    Name of patient: Quang Carrera    Patients : 1945    Nature of the call: not disclosed     Was doctor paged: unable to page ([facility] office notified)

## 2022-11-16 NOTE — ED ATTESTATION NOTE
I have personally seen, evaluated,and participated in the management of Quang Carrera.  I reviewed and agree with the PA/NP's assessment and plan of care with the following exceptions: None    My examination, assessment, and plan of care for Quang Carrera:  77-year-old male non-smoker denies any recent drug or alcohol use without any significant past medical history presented for syncope x2.  According to the patient, at 5 AM this morning he was in mid micturition when he passed out striking the back of his head.  Visited urgent care later on this morning and syncopized while there was stapling his laceration.    Denies any recent hematuria bloody stools melena.  Denies any vomiting diarrhea.  Recently started on Augmentin for bronchitis   exam:   Vitals:    11/16/22 1054   BP:    Pulse:    Resp:    Temp: 37.2 °C (99 °F)   SpO2:    Awake and alert good eye contact pleasant cooperative with high Acebedo staple scalp laceration line approximately 6 cm.  Nontender cervical spine.  No respiratory distress with normal complexion conjunctiva.  Soft nontender nondistended abdomen.  Right forehead abrasion is secondary to biopsy and not from the falls today    Plan:  Monitorings IV fluids EKG chest x-ray labs     Hua Zacarias MD  11/16/22 1206

## 2022-11-16 NOTE — ASSESSMENT & PLAN NOTE
Mild hyponatremia with hypochloremia  Decreased intake per patient, possibly related to volume depletion  Low IV fluids and reassess

## 2022-11-17 ENCOUNTER — APPOINTMENT (OUTPATIENT)
Dept: CARDIOLOGY | Facility: HOSPITAL | Age: 77
Setting detail: OBSERVATION
End: 2022-11-17
Attending: INTERNAL MEDICINE
Payer: COMMERCIAL

## 2022-11-17 VITALS
HEART RATE: 95 BPM | HEIGHT: 72 IN | DIASTOLIC BLOOD PRESSURE: 70 MMHG | WEIGHT: 193 LBS | SYSTOLIC BLOOD PRESSURE: 156 MMHG | RESPIRATION RATE: 18 BRPM | OXYGEN SATURATION: 96 % | TEMPERATURE: 98 F | BODY MASS INDEX: 26.14 KG/M2

## 2022-11-17 PROBLEM — S01.01XA LACERATION OF SCALP: Status: ACTIVE | Noted: 2022-11-17

## 2022-11-17 LAB
AORTIC ROOT ANNULUS: 3.8 CM
ASCENDING AORTA: 3.9 CM
ATRIAL RATE: 93
BSA FOR ECHO PROCEDURE: 2.11 M2
E WAVE DECELERATION TIME: 169 MS
E/A RATIO: 0.7
E/E' RATIO: 13.2
E/LAT E' RATIO: 9.7
EDV (BP): 70.3 CM3
EF (A4C): 49 %
EF A2C: 49.6 %
EJECTION FRACTION: 48.9 %
ESV (BP): 35.9 CM3
LA/AORTA RATIO: 1.05
LAAS-AP4: 19.9 CM2
LAD 2D: 4 CM
LALD A4C: 5.61 CM
LEFT ATRIUM VOLUME INDEX: 26.54 CM3/M2
LEFT ATRIUM VOLUME: 56 CM3
LEFT VENTRICLE DIASTOLIC VOLUME INDEX: 37.35 CM3/M2
LEFT VENTRICLE DIASTOLIC VOLUME: 78.8 CM3
LEFT VENTRICLE SYSTOLIC VOLUME INDEX: 19.1 CM3/M2
LEFT VENTRICLE SYSTOLIC VOLUME: 40.3 CM3
LV DIASTOLIC VOLUME: 63.2 CM3
LV ESV (APICAL 2 CHAMBER): 31.9 CM3
LVAD-AP2: 24.5 CM2
LVAD-AP4: 27.1 CM2
LVAS-AP2: 16.3 CM2
LVAS-AP4: 18.1 CM2
LVEDVI(A2C): 29.95 CM3/M2
LVEDVI(BP): 33.32 CM3/M2
LVESVI(A2C): 15.12 CM3/M2
LVESVI(BP): 17.01 CM3/M2
LVLD-AP2: 7.55 CM
LVLD-AP4: 7.53 CM
LVLS-AP2: 6.96 CM
LVLS-AP4: 6.79 CM
LVOT 2D: 2 CM
LVOT A: 3.14 CM2
LVOT PEAK VELOCITY: 0.85 M/S
MR VELOCITY: 5.84 M/S
MV E'TISSUE VEL-LAT: 0.08 M/S
MV E'TISSUE VEL-MED: 0.06 M/S
MV PEAK A VEL: 1.16 M/S
MV PEAK E VEL: 0.8 M/S
MV VALVE AREA P 1/2 METHOD: 4.4 CM2
P AXIS: 103
PR INTERVAL: 238
QRS DURATION: 142
QT INTERVAL: 382
QTC CALCULATION(BAZETT): 474
R AXIS: 52
T WAVE AXIS: -16
TAPSE: 2.64 CM
VENTRICULAR RATE: 93

## 2022-11-17 PROCEDURE — G0378 HOSPITAL OBSERVATION PER HR: HCPCS

## 2022-11-17 PROCEDURE — 93306 TTE W/DOPPLER COMPLETE: CPT | Mod: 26 | Performed by: STUDENT IN AN ORGANIZED HEALTH CARE EDUCATION/TRAINING PROGRAM

## 2022-11-17 PROCEDURE — 99217 PR OBSERVATION CARE DISCHARGE MANAGEMENT: CPT | Performed by: HOSPITALIST

## 2022-11-17 PROCEDURE — 25000000 HC PHARMACY GENERAL: Performed by: HOSPITALIST

## 2022-11-17 PROCEDURE — C8929 TTE W OR WO FOL WCON,DOPPLER: HCPCS

## 2022-11-17 PROCEDURE — 25500000 HC DRUGS/INCIDENT RAD: Performed by: HOSPITALIST

## 2022-11-17 PROCEDURE — 63700000 HC SELF-ADMINISTRABLE DRUG: Performed by: INTERNAL MEDICINE

## 2022-11-17 PROCEDURE — 99214 OFFICE O/P EST MOD 30 MIN: CPT | Performed by: STUDENT IN AN ORGANIZED HEALTH CARE EDUCATION/TRAINING PROGRAM

## 2022-11-17 RX ORDER — FLUTICASONE PROPIONATE 50 MCG
1 SPRAY, SUSPENSION (ML) NASAL DAILY
Status: DISCONTINUED | OUTPATIENT
Start: 2022-11-17 | End: 2022-11-17 | Stop reason: HOSPADM

## 2022-11-17 RX ORDER — FLUTICASONE PROPIONATE 50 MCG
1 SPRAY, SUSPENSION (ML) NASAL DAILY
Qty: 1 G | Refills: 0 | Status: SHIPPED | OUTPATIENT
Start: 2022-11-17 | End: 2023-02-23

## 2022-11-17 RX ADMIN — AMOXICILLIN AND CLAVULANATE POTASSIUM 1 TABLET: 875; 125 TABLET, FILM COATED ORAL at 08:39

## 2022-11-17 RX ADMIN — SODIUM CHLORIDE: 9 INJECTION INTRAMUSCULAR; INTRAVENOUS; SUBCUTANEOUS at 10:45

## 2022-11-17 NOTE — PATIENT CARE CONFERENCE
Care Progression Rounds Note  Date: 11/17/2022  Time: 2:06 PM     Patient Name: Quang Carrera     Medical Record Number: [id number]   YOB: 1945  Sex: Male      Room/Bed: 0316    Admitting Diagnosis: Syncope and collapse [R55]  Maxillary sinusitis, unspecified chronicity [J32.0]  COVID-19 [U07.1]   Admit Date/Time: 11/16/2022 10:42 AM    Primary Diagnosis: Syncope and collapse  Principal Problem: Syncope and collapse    GMLOS: pending  Anticipated Discharge Date: 11/17/2022    AM-PAC:  Mobility Score:      Discharge Planning:  Concerns to be Addressed: no discharge needs identified  Anticipated Discharge Disposition: home without assistance or services    Barriers to Discharge:  Medical issues not resolved    Comments:       Participants:  social work/services, nursing, physician, physical therapy,

## 2022-11-17 NOTE — PLAN OF CARE
Attempted CC screen, left message for Esme Carrera, await return call. Social work will remain available for emotional support and DC planning. SANDIE Martino

## 2022-11-17 NOTE — DISCHARGE SUMMARY
Hospital Medicine Service -  Inpatient Discharge Summary        BRIEF OVERVIEW   Admitting Provider: Krystyna Gupta MD  Attending Provider: Hiwot Corona MD Attending phys phone: [phone]    PCP: Mary Wise [phone]    Admission Date: 11/16/2022  Discharge Date: 11/17/2022     DISCHARGE DIAGNOSES      Primary Discharge Diagnosis  Syncope and collapse    Secondary Discharge Diagnoses  Active Hospital Problems    Diagnosis Date Noted    Syncope and collapse 11/16/2022     Priority: High    Laceration of scalp 11/17/2022    COVID-19 virus infection 11/16/2022    Leukocytosis 11/16/2022    Hyponatremia 11/16/2022    Acute sinusitis 01/14/2019      Resolved Hospital Problems   No resolved problems to display.       Problem List on Day of Discharge  * Syncope and collapse  Assessment & Plan  2/2 to COVID and dehydration   S/p 1 liter of normal saline   Currently without any significant EKG changes concerning for the episode  Echo done here stable  Cardiology okay to discharge recommending an outpatient cardiac monitor with his outpatient cardiologist     Laceration of scalp  Assessment & Plan  2/2 fall  S/p staples on admission  Outpatient f/u with PCP for removal in 5-7 days      Hyponatremia  Assessment & Plan  Mild hyponatremia with hypochloremia  Decreased intake per patient, possibly related to volume depletion  S/p IVFS  Outpatient repeat BMP     Leukocytosis  Assessment & Plan  Likely reactive w/syncope and injury and sinusitis  Outpatient f/u     COVID-19 virus infection  Assessment & Plan  No hypoxia  Supportive care  COVID care at home instructions placed in discharge     Acute sinusitis  Assessment & Plan  Currently on oral antibiotics  Can be continued to complete the course    SUMMARY OF HOSPITALIZATION      Presenting Problem/History of Present Illness  Quang Carrera is a 77 y.o. male with a history of hypertension, hyperlipidemia, hemangioma along with other conditions as  below presents with concerns for the above symptoms.     States that he got out of bed to go to the bathroom and felt slightly lightheaded when he stood.  He later then went to void and the next thing he knew he was being on the floor.  Wife heard the sound and went up to check on him.  He was not responding initially but within less than a minute he started responding and was able to get up.     Went to urgent care to get sutures as he had hit his head and had scalp injury.  When he was getting sutures seems like he had another episode where he lost his consciousness briefly.  He was sitting at that time.  He denied any chest pain, palpitations or shortness of breath surrounding this episode.  Denies any similar symptoms in the past.       He states that he was not eating and drinking much and his appetite taste were gone. He also has been having cough and nasal congestion greenish discharge from his nose for the last 6 days.  He was diagnosed with acute sinusitis and colitis, and initiated antibiotics.  States that his cough is getting better and his sinus symptoms have been getting better as well.       During all this time he did not have any symptoms of fevers, chills, sore throat, difficulty breathing, myalgias.  He was also not tested for COVID during this time.  His last booster dose was around a month ago October 19, 2022.     He denies any abdominal pain, nausea, vomiting with diarrhea or constipation, new urinary symptoms, or new joint pains or rashes as well. Has had Holter monitors in the past but was more related to lightheadedness.     Hospital Course  This is a 77-year-old male who presented after 2 episodes of syncope and sustained a posterior scalp laceration requiring staples to his scalp.  Patient had reported poor p.o. intake recently due to recent illness.  He was not positive for COVID.  He was seen by cardiology who recommended admitting under observation to have an echocardiogram given the  syncope.  Echocardiogram was stable and patient was cleared for discharge with recommendations for and outpatient cardiac monitor with his outpatient cardiologist.  No changes to his home medications were made. Pt will need a repeat BMP and CBC as an outpatient in 1 week to f/u on his sodium and wbc count.     Exam on Day of Discharge  Physical Exam  HENT:      Head: Normocephalic.      Mouth/Throat:      Mouth: Mucous membranes are moist.   Cardiovascular:      Rate and Rhythm: Normal rate and regular rhythm.      Pulses: Normal pulses.      Heart sounds: Normal heart sounds.   Pulmonary:      Effort: Pulmonary effort is normal.      Breath sounds: Normal breath sounds.   Abdominal:      General: Bowel sounds are normal.      Palpations: Abdomen is soft.   Musculoskeletal:         General: Normal range of motion.      Cervical back: Normal range of motion.      Right lower leg: No edema.      Left lower leg: No edema.   Skin:     General: Skin is warm.      Capillary Refill: Capillary refill takes less than 2 seconds.      Comments: [place]ichael present posterior scalp   Neurological:      General: No focal deficit present.      Mental Status: He is alert and oriented to person, place, and time.   Psychiatric:         Mood and Affect: Mood normal.         Behavior: Behavior normal.         Consults During Admission  IP CONSULT TO CARDIOLOGY    DISCHARGE MEDICATIONS               Medication List      CONTINUE taking these medications    albuterol HFA 90 mcg/actuation inhaler  Commonly known as: VENTOLIN HFA  Inhale 2 puffs every 6 (six) hours as needed for wheezing.  Dose: 2 puff     amoxicillin-pot clavulanate 875-125 mg per tablet  Commonly known as: AUGMENTIN  Take 1 tablet by mouth 2 (two) times a day for 7 days.  Dose: 1 tablet             Instructions for after discharge     Call provider for:  difficulty breathing, headache or visual disturbances      Call provider for:  persistent dizziness or light-headedness       Call provider for:  persistent nausea or vomiting      Discharge diet      Diet Type / Texture:  Regular  Cardiac (Low Sodium, Low Fat)       Fluid Consistency: Thin Liquids    Follow-up with primary physician (PCP)      Follow-up with your primary care provider in 1 week of discharge    Post-Discharge Activity: Normal activity as tolerated.      Normal activity as tolerated.             PROCEDURES / LABS / IMAGING      Operative Procedures      Other Procedures      Pertinent Labs  Results from last 7 days   Lab Units 11/16/22  1054   SODIUM mEQ/L 131*   POTASSIUM mEQ/L 3.7   CHLORIDE mEQ/L 96*   CO2 mEQ/L 26   BUN mg/dL 18   CREATININE mg/dL 1.3   CALCIUM mg/dL 9.1   GLUCOSE mg/dL 103*     Results from last 7 days   Lab Units 11/16/22  1054   WBC K/uL 21.85*   HEMOGLOBIN g/dL 14.0   HEMATOCRIT % 42.2   PLATELETS K/uL 418*         SARS-CoV-2 (COVID-19) (no units)   Date/Time Value   11/16/2022 1107 Positive (A)       Pertinent Imaging  X-RAY CHEST 2 VIEWS    Result Date: 11/16/2022  Narrative: CLINICAL HISTORY: Cough PRIOR STUDY: Chest x-ray dated August 15, 2011 TECHNIQUE: AP and lateral image of the chest COMMENT:  The lungs are clear. The heart size is normal. There is thoracic degenerative change.     Impression: IMPRESSION: No definite active disease.     CT HEAD WITHOUT IV CONTRAST    Result Date: 11/16/2022  Narrative: CLINICAL HISTORY:  Head trauma, minor. COMMENT: Comparison: None. Technique: CT examination of the brain was performed without contrast. Sagittal and coronal reconstructions were obtained. CT DOSE:  One or more dose reduction techniques (e.g. automated exposure control, adjustment of the mA and/or kV according to patient size, use of iterative reconstruction technique) was utilized for this examination. Findings: Ventricles and sulci are prominent, likely compatible with age-related cerebral volume loss. No acute intracranial hemorrhage, extra axial collection, mass effect or edema. No CT  evidence of large acute territorial infarction. Patchy periventricular and subcortical white matter hypoattenuation, which is nonspecific, however likely compatible with chronic microangiopathy. Intracranial vascular calcifications at the skull base. Mild-moderate mucosal thickening in the paranasal sinuses with layering fluid levels in the maxillary sinuses, which can be seen in the clinical setting of acute sinusitis. The calvarium is intact. Bilateral lens replacements.  Mild posterior scalp swelling with staples.     Impression: IMPRESSION: 1.  No acute intracranial hemorrhage or mass-effect.  Mild posterior scalp swelling with staples. 2.  Paranasal sinus disease, which can be seen in the clinical setting of acute sinusitis.      OUTPATIENT  FOLLOW-UP / REFERRALS / PENDING TESTS        Outpatient Follow-Up Appointments    Referrals    Test Results Pending at Discharge      Important Issues to Address in Follow-Up  You presented for syncope and sustained a scalp laceration with staples placed    Follow up with your primary care provider in 5 to 7 days to have the staples removed.    You had an Echo done here and evaluation by cardiology. The syncope was felt to be due to COVID and dehydration    Cardiology okay to discharge and recommending an outpatient cardiac monitor with your outpatient cardiologist     Please adequately hydrate     Get a repeat BMP And CBC with your primary care provider to monitor your sodium which is at 131 and your WBC.     While you were hospitalized you were tested for COVID-19/Coronavirus and the results were positive (meaning you do/did have COVID-19/Coronavirus).  The Health Department will be in touch with you.  If they do not call, please contact them using the appropriate phone number below.     After discharge the [facility] requires you to remain in quarantine (away from others) for at least 7 days after your symptoms started PLUS at least 3 days (72 hours) have  passed since you were better (meaning no fever AND improvement in respiratory symptoms (e.g., cough, shortness of breath).  If you are a healthcare provider please ensure you check with the Department of Health before removing yourself from quarantine.     The [place] Centers for Disease Control provides us with instructions of how you should be quarantined.  These instructions can also be found at: https://www.cdc.gov/coronavirus/2019-ncov/downloads/sick-with-2019-nCoV-fact-sheet.pdf      Stay home except to get medical care. You should restrict activities outside your home, except for getting medical care. Do not go to work, school, or public areas. Avoid using public transportation, ride-sharing, or taxis.   Separate yourself from other people and animals in your home. People: As much as possible, you should stay in a specific room and away from other people in your home. Also, you should use a separate bathroom, if available. Animals: Do not handle pets or other animals while sick.   Call ahead before visiting your doctor. If you have a medical appointment, call the healthcare provider and tell them that you have or may have COVID-19. This will help the healthcare provider's office take steps to keep other people from getting infected or exposed.   Wear a facemask: You should wear a facemask when you are around other people (e.g., sharing a room or vehicle) or pets and before you enter a healthcare provider's office. If you are not able to wear a facemask (for example, because it causes trouble breathing), then people who live with you should not stay in the same room with you, or they should wear a facemask if they enter your room.   Cover your coughs and sneezes. Cover your mouth and nose with a tissue when you cough or sneeze. Throw used tissues in a lined trash can; immediately wash your hands with soap and water for at least 20 seconds or clean your hands with an alcohol-based hand  that  contains at least 60-95% alcohol covering all surfaces of your hands and rubbing them together until they feel dry. Soap and water should be used preferentially if hands are visibly dirty   Avoid sharing personal household items. You should not share dishes, drinking glasses, cups, eating utensils, towels, or bedding with other people or pets in your home. After using these items, they should be washed thoroughly with soap and water.   Clean your hands often. Wash your hands often with soap and water for at least 20 seconds. If soap and water are not available, clean your hands with an alcohol-based hand  that contains at least 60% alcohol, covering all surfaces of your hands and rubbing them together until they feel dry. Soap and water should be used preferentially if hands are visibly dirty. Avoid touching your eyes, nose, and mouth with unwashed hands.   Clean all ?igh-touch surfaces every day. High touch surfaces include counters, tabletops, doorknobs, bathroom fixtures, toilets, phones, keyboards, tablets, and bedside tables. Also, clean any surfaces that may have blood, stool, or body fluids on them. Use a household cleaning spray or wipe, according to the label instructions. Labels contain instructions for safe and effective use of the cleaning product including precautions you should take when applying the product, such as wearing gloves and making sure you have good ventilation during use of the product.   Monitor your symptoms. Seek prompt medical attention if your illness is worsening (e.g., difficulty breathing). Before seeking care, call your healthcare provider and tell them that you have, or are being evaluated for, COVID-19. Put on a facemask before you enter the facility. These steps will help the healthcare provider's office to keep other people in the office or waiting room from getting infected or exposed.   If you have a medical emergency and need to call 911, notify the dispatch  personnel that you have, or are being evaluated for COVID-19. If possible, put on a facemask before emergency medical services arrive.        If you notice any worsening shortness of breath, light-headedness, confusion, extreme fatigue please call you doctor or return to the hospital immediately.      For further information or if you are not called by the Health Department:     For All [place] Residents  [place] Department of Health Information webpage: https://www.[facility].pa.gov/providers/Providers/Pages/Coronavirus-2020.aspx  [place] Department of Health COVID-19 phone number: 9-799-OX-[place] (1-588.700.7059)     For All [facility] webpage: [email or web address]://www.[facility]/services/mental-physical-health/environmental-health-hazards/covid-19/  On license of [facility] phone number: [phone]     For All [facility] webpage: https://data-[facility]/pages/covid-19  [facility] phone number: [phone]     For Residents of [facility] Information webpage: https://www.[facility].pa.gov/providers/Providers/Pages/Coronavirus-2020.aspx  [place] Department of Health COVID-19 phone number: 7-285-[place]J-[place] ([phone])     For all [facility] webpage: [email or web address]  [facility] phone number: 1-890.152.9673     [place] Center for Disease Control (CDC) COVID-19 Information Webpage: https://www.cdc.gov/coronavirus/2019-nCoV/index.html       DISCHARGE DISPOSITION AND DESTINATION      Disposition: Home   Destination:                              Code Status At Discharge: Full Code    Physician Order for Life-Sustaining Treatment Document Status      No documents found

## 2022-11-17 NOTE — ASSESSMENT & PLAN NOTE
Mild hyponatremia with hypochloremia  Decreased intake per patient, possibly related to volume depletion  S/p IVFS  Outpatient repeat BMP

## 2022-11-17 NOTE — PROGRESS NOTES
[facility] Cardiology  [facility] Progress Note          Interval History     Echocardiogram today with preserved ejection fraction and no significant valvular disease.  No events on telemetry.  On interview he notes that he feels back to normal.  Denies any chest pain, palpitations, dyspnea, dizziness, lightheadedness, presyncope, or syncope.             Medications     Scheduled Inpatient Medications:   enoxaparin  40 mg subcutaneous Daily (6p)       Scheduled Inpatient Infusions:       Vital Signs/Exam     Vital signs in last 24 hours:  Temp:  [36.8 °C (98.2 °F)-37.4 °C (99.3 °F)] 37 °C (98.6 °F)  Heart Rate:  [] 76  Resp:  [16-18] 18  BP: (134-164)/(54-79) 155/76    I/O  No intake or output data in the 24 hours ending 11/17/22 1346    Weight  Weight change:     Physical Exam:  Visit Vitals  BP (!) 155/76 (BP Location: Right upper arm, Patient Position: Lying)   Pulse 76   Temp 37 °C (98.6 °F) (Oral)   Resp 18   Ht 1.829 m (6')   Wt 87.5 kg (193 lb)   SpO2 93%   BMI 26.18 kg/m²       Gen: no distress  HEENT: no jvd  Lungs: clear bilaterally; no crackles, rhonchi, wheezing  Chest wall: no tenderness  Heart: regular rate and rhythm; no murmur  Abdomen: nondistended, nontender  Extremities: no edema  Neuro: nonfocal, alert and oriented  Psych: normal mood and affect     Diagnostic Data   Diagnostic data personally reviewed.    Labs:  Results from last 7 days   Lab Units 11/16/22  1054   WBC K/uL 21.85*   HEMOGLOBIN g/dL 14.0   HEMATOCRIT % 42.2   PLATELETS K/uL 418*     Results from last 7 days   Lab Units 11/16/22  1054   SODIUM mEQ/L 131*   POTASSIUM mEQ/L 3.7   CHLORIDE mEQ/L 96*   CO2 mEQ/L 26   BUN mg/dL 18   CREATININE mg/dL 1.3   GLUCOSE mg/dL 103*   CALCIUM mg/dL 9.1         High Sens Troponin I   Date Value Ref Range Status   11/16/2022 5.5 <15.0 pg/mL Final   11/16/2022 4.1 <15.0 pg/mL Final                               Cardiac cath:      Echocardiogram:  Cardiac Imaging    TRANSTHORACIC  ECHO (TTE) COMPLETE 11/17/2022    Interpretation Summary  · Normal-sized LV. Normal LV systolic function. Estimated EF 60- 65%. No regional wall motion abnormalities. Normal LV wall thickness. Unable to assess diastolic filling pattern of the LV.  · Normal-sized RV. Normal RV systolic function.  · Aortic valve not well seen but likely trileaflet. Sclerotic aortic valve leaflets. No aortic valve regurgitation. Limited doppler evaluation does not suggest significant aortic valve stenosis.  · Mild dilatation of the ascending aorta at 3.9 cm.  · Normal leaflet structure and normal leaflet motion of the mitral valve. Mild mitral annular calcification.  · No significant mitral valve regurgitation.  · Tricuspid valve structure is normal. No tricuspid valve regurgitation. Jet is insufficient to calculate pulmonary artery pressure.  · Pulmonic valve not well visualized.  · Normal-sized IVC. IVC collapses >50% during inspiration.  · No evidence of pericardial effusion.  · No prior study available for comparison.      Telemetry: Recent telemetry independently reviewed: Sinus rhythm       Assessment and Plan    CODE STATUS: Full Code      Active Hospital Problems    Diagnosis Date Noted    Laceration of scalp 11/17/2022    Syncope and collapse 11/16/2022    COVID-19 virus infection 11/16/2022    Leukocytosis 11/16/2022    Hyponatremia 11/16/2022    Acute sinusitis 01/14/2019      Resolved Hospital Problems   No resolved problems to display.       Quang Carrera is a 77 y.o. male with a history of Brito's esophagus who presents with 2 episodes of syncope.    Syncope and collapse   -He had previously been seen a cardiologist in 2020, he had an echocardiogram, stress test and cardiac which were reported to be largely unremarkable.  -Presented with two syncopal episodes, one after urinating resulting in a fall with scalp laceration and one episode as he was getting staples placed for the scalp laceration   -CT of the head  showed no acute hemorrhage, mild posterior scalp swelling with staples and paranasal sinus disease  -Will monitor on telemetry, at discharge would recommend an outpatient cardiac monitor   -Echocardiogram with preserved ejection fraction and no significant valvular disease     Covid 19 infection   -Reports 6 days of ongoing upper respiratory symptoms  -Tested positive for COVID-19 currently on arrival in the ED  -WBC 21.85, chest x-ray showed no active disease   -Further management per [facility] [place] [place] recommendation     He can follow-up with his prior cardiologist for outpatient cardiac monitor.      Florecita Montez MD    [facility] Cardiology, Main Office: [phone]  [facility]: [facility]    11/17/2022

## 2022-11-17 NOTE — DISCHARGE INSTRUCTIONS
You presented for syncope and sustained a scalp laceration with staples placed    Follow up with your primary care provider in 5 to 7 days to have the staples removed.    You had an Echo done here and evaluation by cardiology. The syncope was felt to be due to COVID and dehydration    Cardiology okay to discharge and recommending an outpatient cardiac monitor with your outpatient cardiologist     Please adequately hydrate     Get a repeat BMP tomorrow if able to and a CBC on 11/21/22 this must be obtained through your primary care provider to monitor your sodium which is at 131 and your WBC.     Do not drive, handle machinery, heights, and swimming until clear by your cardiologist after an outpatient cardiac monitor.     While you were hospitalized you were tested for COVID-19/Coronavirus and the results were positive (meaning you do/did have COVID-19/Coronavirus).  The Health Department will be in touch with you.  If they do not call, please contact them using the appropriate phone number below.     After discharge the [facility] requires you to remain in quarantine (away from others) for at least 7 days after your symptoms started PLUS at least 3 days (72 hours) have passed since you were better (meaning no fever AND improvement in respiratory symptoms (e.g., cough, shortness of breath).  If you are a healthcare provider please ensure you check with the Department of Health before removing yourself from quarantine.     The [place] Centers for Disease Control provides us with instructions of how you should be quarantined.  These instructions can also be found at: https://www.cdc.gov/coronavirus/2019-ncov/downloads/sick-with-2019-nCoV-fact-sheet.pdf     Stay home except to get medical care. You should restrict activities outside your home, except for getting medical care. Do not go to work, school, or public areas. Avoid using public transportation, ride-sharing, or taxis.  Separate yourself from  other people and animals in your home. People: As much as possible, you should stay in a specific room and away from other people in your home. Also, you should use a separate bathroom, if available. Animals: Do not handle pets or other animals while sick.  Call ahead before visiting your doctor. If you have a medical appointment, call the healthcare provider and tell them that you have or may have COVID-19. This will help the healthcare provider's office take steps to keep other people from getting infected or exposed.  Wear a facemask: You should wear a facemask when you are around other people (e.g., sharing a room or vehicle) or pets and before you enter a healthcare provider's office. If you are not able to wear a facemask (for example, because it causes trouble breathing), then people who live with you should not stay in the same room with you, or they should wear a facemask if they enter your room.  Cover your coughs and sneezes. Cover your mouth and nose with a tissue when you cough or sneeze. Throw used tissues in a lined trash can; immediately wash your hands with soap and water for at least 20 seconds or clean your hands with an alcohol-based hand  that contains at least 60-95% alcohol covering all surfaces of your hands and rubbing them together until they feel dry. Soap and water should be used preferentially if hands are visibly dirty  Avoid sharing personal household items. You should not share dishes, drinking glasses, cups, eating utensils, towels, or bedding with other people or pets in your home. After using these items, they should be washed thoroughly with soap and water.  Clean your hands often. Wash your hands often with soap and water for at least 20 seconds. If soap and water are not available, clean your hands with an alcohol-based hand  that contains at least 60% alcohol, covering all surfaces of your hands and rubbing them together until they feel dry. Soap and water  should be used preferentially if hands are visibly dirty. Avoid touching your eyes, nose, and mouth with unwashed hands.  Clean all ?igh-touch surfaces every day. High touch surfaces include counters, tabletops, doorknobs, bathroom fixtures, toilets, phones, keyboards, tablets, and bedside tables. Also, clean any surfaces that may have blood, stool, or body fluids on them. Use a household cleaning spray or wipe, according to the label instructions. Labels contain instructions for safe and effective use of the cleaning product including precautions you should take when applying the product, such as wearing gloves and making sure you have good ventilation during use of the product.  Monitor your symptoms. Seek prompt medical attention if your illness is worsening (e.g., difficulty breathing). Before seeking care, call your healthcare provider and tell them that you have, or are being evaluated for, COVID-19. Put on a facemask before you enter the facility. These steps will help the healthcare provider's office to keep other people in the office or waiting room from getting infected or exposed.  If you have a medical emergency and need to call 911, notify the dispatch personnel that you have, or are being evaluated for COVID-19. If possible, put on a facemask before emergency medical services arrive.        If you notice any worsening shortness of breath, light-headedness, confusion, extreme fatigue please call you doctor or return to the hospital immediately.      For further information or if you are not called by the Health Department:     For All [place] Residents  [place] Department of Health Information webpage: https://www.[facility].pa.gov/providers/Providers/Pages/Coronavirus-2020.aspx  [place] Department of Health COVID-19 phone number: 3-333-YBCOADE (1-199.439.9843)     For All [facility] webpage:  https://www.[facility].gov/services/mental-physical-health/environmental-health-hazards/covid-19/  [facility] phone number: 1-160.832.1794     For All [facility] Residents  [facility] webpage: [email or web address]://data-prasad.amiando.[employer]/pages/covid-19  [facility] phone number: [phone]     For Residents of [facility] Information webpage: https://www.[facility].[place].gov/providers/Providers/Pages/Coronavirus-2020.aspx  [place] Department of Health COVID-19 phone number: 7-155-PC-Job36 (1-853.449.6915)     For all [facility] Residents  [facility] webpage: [email or web address]://[email or web address]/Health  [facility] phone number: 1-828.710.4697     [facility] for Disease Control (CDC) COVID-19 Information Webpage: https://www.cdc.gov/coronavirus/2019-nCoV/index.html         PHYSICAL EXAMINATION                Physical Exam    LABS / EKG

## 2022-11-17 NOTE — ASSESSMENT & PLAN NOTE
2/2 to COVID and dehydration   S/p 1 liter of normal saline   Currently without any significant EKG changes concerning for the episode  Echo done here stable  Cardiology okay to discharge recommending an outpatient cardiac monitor with his outpatient cardiologist

## 2022-11-17 NOTE — PLAN OF CARE
Problem: Adult Inpatient Plan of Care  Goal: Plan of Care Review  Outcome: Progressing  Flowsheets (Taken 11/17/2022 2941)  Progress: improving  Plan of Care Reviewed With: patient  Outcome Summary: pt with no complaints overnight. up walking to the bathroom with standby assist, no issues. staples intact. VSS  Goal: Patient-Specific Goal (Individualized)  Outcome: Progressing  Goal: Absence of Hospital-Acquired Illness or Injury  Outcome: Progressing  Goal: Optimal Comfort and Wellbeing  Outcome: Progressing  Goal: Readiness for Transition of Care  Outcome: Progressing     Problem: Infection  Goal: Absence of Infection Signs and Symptoms  Outcome: Progressing

## 2022-11-18 ENCOUNTER — PATIENT OUTREACH (OUTPATIENT)
Dept: CASE MANAGEMENT | Facility: CLINIC | Age: 77
End: 2022-11-18
Payer: COMMERCIAL

## 2022-11-18 ASSESSMENT — ENCOUNTER SYMPTOMS
DIARRHEA: 0
HEADACHES: 0
RHINORRHEA: 0
DIFFICULTY URINATING: 0
DIZZINESS: 0
LIGHT-HEADEDNESS: 0
SHORTNESS OF BREATH: 0
CONSTIPATION: 0
WOUND: 1
COUGH: 1
FATIGUE: 1
APPETITE CHANGE: 0

## 2022-11-18 NOTE — PROGRESS NOTES
NAME: Quang Carrera    MRN: [id number]    YOB: 1945    Event Review:    Initial TCM Patient Outreach Date: 11/18/22    Assessment completed with: Patient  Patient stated reason for hospitalization: passed out while going to the bathroom at night, then passed out at   Discharge Diagnosis: Syncope and collapse    Patient readmitted in the last 30 days: No  Discharging Facility: [facility]  Date of Last Admission: 11/16/22  Date of Last Discharge: 11/17/22               Patient's perception of their health status since discharge: Improving    HPI: patient presented after a syncopal episode at [facility] that required five staples in the back of his head. He was being treated for a sinus infection and bronchitis as an OP. He became woozy and fainted out again while at  and was transferred to [facility]. He tested positive for COVID, was hyponatremic and received IVF. He was monitored overnight and cleared by cardiology for DC. Patient reported he is feeling better. He was acutally a [employer] shopping.  He noted staples were intact and will need to have them removed next week.     Review of Systems   Constitutional: Positive for fatigue. Negative for appetite change.   HENT: Positive for postnasal drip. Negative for rhinorrhea.    Respiratory: Positive for cough. Negative for shortness of breath.    Cardiovascular: Negative for chest pain.   Gastrointestinal: Negative for constipation and diarrhea.   Genitourinary: Negative for difficulty urinating.   Skin: Positive for wound.        Staples in back of head   Neurological: Negative for dizziness, syncope, light-headedness and headaches.       Medication Review:    Medication Review: Yes     Reported by: Patient  Any new medications prescribed at discharge?: Yes  Is the patient having any side effects they believe may be caused by any medication additions or changes?: No  New prescriptions filled?: Yes     Do you have enough of your regularly prescribed  medications?: Yes       Medication adherence problem?: No  Was a medication discrepancy indentified?: No       Nursing Interventions: No intervention needed  Reconciled the current and discharge medications: Yes  Reviewed AVS (Discharge Instructions)?: Yes         Acute Pain:    Acute pain: No                Chronic Pain:    Chronic pain: No                Diet/Nutrition:    Type of Diet: Regular  Diet Adherence: Adherent with diet          Home Care Services:          Home Care Interventions: No intervention required     Post-Discharge Durable Medical Equipment::    Durable Medical Equipment: None  Oxygen Use: No              DME Interventions: No intervention required    Home Management:    Living Arrangement: Spouse  Support System:: Spouse  Type of Residence: [address] [place] [address]  Home Monitoring: None  Any patient reported falls in the last 3 months?: Yes  Mandaeism or spiritual beliefs that impact treatment?: No    Appointment Scheduling:    PCP appointment scheduled: No              Patient Scheduling Dispositions: No availability on schedule/message sent to office to assist with scheduling     Follow-Ups:  BMP 1 week          Interventions/ Care Coordination:    Interventions/ Care Coordination: Assisted patient in the scheduling of an appointment, Addressed a knowledge deficit  General Education: Respiratory precautions (flu, colds, pneumonia, COVID-19)       Reviewed signs/symptoms of worsening condition or complication that necessitate a call to the Physician's office.  Educated patient on access to care.  RN phone number given for future care management.    Tram Betancur, RN  [phone]

## 2022-11-18 NOTE — PROGRESS NOTES
ITALIA #1  Ambulatory Care Manager phoned patient's cell for ITALIA initial call. Voicemail left informed of Care Coordinator role, provided contact information, requested call back.  Will  continue to follow for ITALIA.   Tram PARKER RN [facility]  Ambulatory Care Manager  [phone]

## 2023-02-23 ENCOUNTER — TELEPHONE (OUTPATIENT)
Dept: INTERNAL MEDICINE | Facility: CLINIC | Age: 78
End: 2023-02-23
Payer: COMMERCIAL

## 2023-02-23 ENCOUNTER — HOSPITAL ENCOUNTER (OUTPATIENT)
Facility: HOSPITAL | Age: 78
Setting detail: OBSERVATION
Discharge: HOME | DRG: 378 | End: 2023-02-27
Attending: STUDENT IN AN ORGANIZED HEALTH CARE EDUCATION/TRAINING PROGRAM | Admitting: INTERNAL MEDICINE
Payer: COMMERCIAL

## 2023-02-23 DIAGNOSIS — K62.5 BRIGHT RED BLOOD PER RECTUM: Primary | ICD-10-CM

## 2023-02-23 DIAGNOSIS — K92.1 HEMATOCHEZIA: ICD-10-CM

## 2023-02-23 DIAGNOSIS — K92.2 LOWER GI BLEED: ICD-10-CM

## 2023-02-23 LAB
ALBUMIN SERPL-MCNC: 4.6 G/DL (ref 3.4–5)
ALP SERPL-CCNC: 80 IU/L (ref 35–126)
ALT SERPL-CCNC: 26 IU/L (ref 16–63)
ANION GAP SERPL CALC-SCNC: 10 MEQ/L (ref 3–15)
AST SERPL-CCNC: 31 IU/L (ref 15–41)
BASOPHILS # BLD: 0.11 K/UL (ref 0.01–0.1)
BASOPHILS NFR BLD: 1.2 %
BILIRUB SERPL-MCNC: 0.3 MG/DL (ref 0.3–1.2)
BUN SERPL-MCNC: 19 MG/DL (ref 8–20)
CALCIUM SERPL-MCNC: 9.6 MG/DL (ref 8.9–10.3)
CHLORIDE SERPL-SCNC: 102 MEQ/L (ref 98–109)
CO2 SERPL-SCNC: 26 MEQ/L (ref 22–32)
CREAT SERPL-MCNC: 1.2 MG/DL (ref 0.8–1.3)
DIFFERENTIAL METHOD BLD: ABNORMAL
EOSINOPHIL # BLD: 0.16 K/UL (ref 0.04–0.54)
EOSINOPHIL NFR BLD: 1.8 %
ERYTHROCYTE [DISTWIDTH] IN BLOOD BY AUTOMATED COUNT: 14 % (ref 11.6–14.4)
GFR SERPL CREATININE-BSD FRML MDRD: 58.9 ML/MIN/1.73M*2
GLUCOSE SERPL-MCNC: 116 MG/DL (ref 70–99)
HCT VFR BLDCO AUTO: 39.5 % (ref 40.1–51)
HGB BLD-MCNC: 13.7 G/DL (ref 13.7–17.5)
IMM GRANULOCYTES # BLD AUTO: 0.02 K/UL (ref 0–0.08)
IMM GRANULOCYTES NFR BLD AUTO: 0.2 %
LIPASE SERPL-CCNC: 38 U/L (ref 20–51)
LYMPHOCYTES # BLD: 2.73 K/UL (ref 1.2–3.5)
LYMPHOCYTES NFR BLD: 30.6 %
MCH RBC QN AUTO: 29.4 PG (ref 28–33.2)
MCHC RBC AUTO-ENTMCNC: 34.7 G/DL (ref 32.2–36.5)
MCV RBC AUTO: 84.8 FL (ref 83–98)
MONOCYTES # BLD: 0.89 K/UL (ref 0.3–1)
MONOCYTES NFR BLD: 10 %
NEUTROPHILS # BLD: 5.02 K/UL (ref 1.7–7)
NEUTS SEG NFR BLD: 56.2 %
NRBC BLD-RTO: 0 %
PDW BLD AUTO: 9.5 FL (ref 9.4–12.4)
PLATELET # BLD AUTO: 325 K/UL (ref 150–350)
POTASSIUM SERPL-SCNC: 3.9 MEQ/L (ref 3.6–5.1)
PROT SERPL-MCNC: 7.7 G/DL (ref 6–8.2)
RBC # BLD AUTO: 4.66 M/UL (ref 4.5–5.8)
SODIUM SERPL-SCNC: 138 MEQ/L (ref 136–144)
WBC # BLD AUTO: 8.93 K/UL (ref 3.8–10.5)

## 2023-02-23 PROCEDURE — 83690 ASSAY OF LIPASE: CPT | Performed by: PHYSICIAN ASSISTANT

## 2023-02-23 PROCEDURE — 93005 ELECTROCARDIOGRAM TRACING: CPT | Performed by: INTERNAL MEDICINE

## 2023-02-23 PROCEDURE — G0378 HOSPITAL OBSERVATION PER HR: HCPCS

## 2023-02-23 PROCEDURE — 80053 COMPREHEN METABOLIC PANEL: CPT | Performed by: STUDENT IN AN ORGANIZED HEALTH CARE EDUCATION/TRAINING PROGRAM

## 2023-02-23 PROCEDURE — 36415 COLL VENOUS BLD VENIPUNCTURE: CPT

## 2023-02-23 PROCEDURE — 85025 COMPLETE CBC W/AUTO DIFF WBC: CPT | Performed by: STUDENT IN AN ORGANIZED HEALTH CARE EDUCATION/TRAINING PROGRAM

## 2023-02-23 PROCEDURE — 86850 RBC ANTIBODY SCREEN: CPT

## 2023-02-23 PROCEDURE — 86900 BLOOD TYPING SEROLOGIC ABO: CPT

## 2023-02-23 PROCEDURE — 99222 1ST HOSP IP/OBS MODERATE 55: CPT | Performed by: INTERNAL MEDICINE

## 2023-02-23 PROCEDURE — 1124F ACP DISCUSS-NO DSCNMKR DOCD: CPT | Performed by: INTERNAL MEDICINE

## 2023-02-23 PROCEDURE — 99283 EMERGENCY DEPT VISIT LOW MDM: CPT | Mod: 25

## 2023-02-23 PROCEDURE — 12000000 HC ROOM AND CARE MED/SURG

## 2023-02-23 RX ORDER — DEXTROSE 40 %
15-30 GEL (GRAM) ORAL AS NEEDED
Status: DISCONTINUED | OUTPATIENT
Start: 2023-02-23 | End: 2023-02-27 | Stop reason: HOSPADM

## 2023-02-23 RX ORDER — DEXTROSE 50 % IN WATER (D50W) INTRAVENOUS SYRINGE
25 AS NEEDED
Status: DISCONTINUED | OUTPATIENT
Start: 2023-02-23 | End: 2023-02-27 | Stop reason: HOSPADM

## 2023-02-23 RX ORDER — ACETAMINOPHEN 325 MG/1
650 TABLET ORAL EVERY 4 HOURS PRN
Status: DISCONTINUED | OUTPATIENT
Start: 2023-02-23 | End: 2023-02-27 | Stop reason: HOSPADM

## 2023-02-23 RX ORDER — IBUPROFEN 200 MG
16-32 TABLET ORAL AS NEEDED
Status: DISCONTINUED | OUTPATIENT
Start: 2023-02-23 | End: 2023-02-27 | Stop reason: HOSPADM

## 2023-02-23 ASSESSMENT — ENCOUNTER SYMPTOMS
NAUSEA: 0
ABDOMINAL DISTENTION: 0
HEMATOCHEZIA: 1
VOMITING: 0
DYSURIA: 0
DIZZINESS: 0
FLANK PAIN: 0
HEADACHES: 0
NUMBNESS: 0
FEVER: 0
CONSTIPATION: 0
ABDOMINAL PAIN: 0
DIFFICULTY URINATING: 0
BACK PAIN: 0
CHILLS: 0
RECTAL PAIN: 0
LIGHT-HEADEDNESS: 0
SHORTNESS OF BREATH: 0
WEAKNESS: 0
HEMATURIA: 0

## 2023-02-23 NOTE — TELEPHONE ENCOUNTER
Spoke with the patient after he called the service he tells me that he has had bleeding from his rectum and was actively bleeding.   he denied any abdominal pain he was advised to go to the emergency room for further evaluation he verbalized understanding and agreed with the plan

## 2023-02-24 PROBLEM — K92.1 HEMATOCHEZIA: Status: ACTIVE | Noted: 2023-02-24

## 2023-02-24 LAB
ABO + RH BLD: NORMAL
ANION GAP SERPL CALC-SCNC: 10 MEQ/L (ref 3–15)
ATRIAL RATE: 76
BLD GP AB SCN SERPL QL: NEGATIVE
BUN SERPL-MCNC: 22 MG/DL (ref 8–20)
CALCIUM SERPL-MCNC: 8.9 MG/DL (ref 8.9–10.3)
CHLORIDE SERPL-SCNC: 100 MEQ/L (ref 98–109)
CO2 SERPL-SCNC: 23 MEQ/L (ref 22–32)
CREAT SERPL-MCNC: 1 MG/DL (ref 0.8–1.3)
D AG BLD QL: POSITIVE
ERYTHROCYTE [DISTWIDTH] IN BLOOD BY AUTOMATED COUNT: 13.7 % (ref 11.6–14.4)
ERYTHROCYTE [DISTWIDTH] IN BLOOD BY AUTOMATED COUNT: 13.8 % (ref 11.6–14.4)
ERYTHROCYTE [DISTWIDTH] IN BLOOD BY AUTOMATED COUNT: 13.9 % (ref 11.6–14.4)
FERRITIN SERPL-MCNC: 158 NG/ML (ref 24–250)
FOLATE SERPL-MCNC: >20 NG/ML
GFR SERPL CREATININE-BSD FRML MDRD: >60 ML/MIN/1.73M*2
GLUCOSE SERPL-MCNC: 137 MG/DL (ref 70–99)
HCT VFR BLDCO AUTO: 29.6 % (ref 40.1–51)
HCT VFR BLDCO AUTO: 33.2 % (ref 40.1–51)
HCT VFR BLDCO AUTO: 33.8 % (ref 40.1–51)
HGB BLD-MCNC: 10.1 G/DL (ref 13.7–17.5)
HGB BLD-MCNC: 11.4 G/DL (ref 13.7–17.5)
HGB BLD-MCNC: 11.6 G/DL (ref 13.7–17.5)
IRON SATN MFR SERPL: 25 % (ref 15–45)
IRON SERPL-MCNC: 84 UG/DL (ref 35–150)
MCH RBC QN AUTO: 28.9 PG (ref 28–33.2)
MCH RBC QN AUTO: 28.9 PG (ref 28–33.2)
MCH RBC QN AUTO: 29.2 PG (ref 28–33.2)
MCHC RBC AUTO-ENTMCNC: 33.7 G/DL (ref 32.2–36.5)
MCHC RBC AUTO-ENTMCNC: 34.1 G/DL (ref 32.2–36.5)
MCHC RBC AUTO-ENTMCNC: 34.9 G/DL (ref 32.2–36.5)
MCV RBC AUTO: 83.6 FL (ref 83–98)
MCV RBC AUTO: 84.8 FL (ref 83–98)
MCV RBC AUTO: 85.6 FL (ref 83–98)
P AXIS: 40
PDW BLD AUTO: 9.5 FL (ref 9.4–12.4)
PDW BLD AUTO: 9.7 FL (ref 9.4–12.4)
PDW BLD AUTO: 9.8 FL (ref 9.4–12.4)
PLATELET # BLD AUTO: 261 K/UL (ref 150–350)
PLATELET # BLD AUTO: 305 K/UL (ref 150–350)
PLATELET # BLD AUTO: 319 K/UL (ref 150–350)
POTASSIUM SERPL-SCNC: 4 MEQ/L (ref 3.6–5.1)
PR INTERVAL: 240
QRS DURATION: 134
QT INTERVAL: 410
QTC CALCULATION(BAZETT): 461
R AXIS: 6
RBC # BLD AUTO: 3.49 M/UL (ref 4.5–5.8)
RBC # BLD AUTO: 3.95 M/UL (ref 4.5–5.8)
RBC # BLD AUTO: 3.97 M/UL (ref 4.5–5.8)
SARS-COV-2 RNA RESP QL NAA+PROBE: NEGATIVE
SODIUM SERPL-SCNC: 133 MEQ/L (ref 136–144)
SPECIMEN EXP DATE BLD: NORMAL
T WAVE AXIS: -20
TIBC SERPL-MCNC: 335 UG/DL (ref 270–460)
UIBC SERPL-MCNC: 251 UG/DL (ref 180–360)
VENTRICULAR RATE: 76
VIT B12 SERPL-MCNC: >1500 PG/ML (ref 180–914)
WBC # BLD AUTO: 10.06 K/UL (ref 3.8–10.5)
WBC # BLD AUTO: 7.74 K/UL (ref 3.8–10.5)
WBC # BLD AUTO: 9.42 K/UL (ref 3.8–10.5)

## 2023-02-24 PROCEDURE — 85027 COMPLETE CBC AUTOMATED: CPT | Performed by: INTERNAL MEDICINE

## 2023-02-24 PROCEDURE — 82607 VITAMIN B-12: CPT | Performed by: STUDENT IN AN ORGANIZED HEALTH CARE EDUCATION/TRAINING PROGRAM

## 2023-02-24 PROCEDURE — 99233 SBSQ HOSP IP/OBS HIGH 50: CPT | Performed by: STUDENT IN AN ORGANIZED HEALTH CARE EDUCATION/TRAINING PROGRAM

## 2023-02-24 PROCEDURE — 93010 ELECTROCARDIOGRAM REPORT: CPT | Performed by: INTERNAL MEDICINE

## 2023-02-24 PROCEDURE — 80048 BASIC METABOLIC PNL TOTAL CA: CPT | Performed by: INTERNAL MEDICINE

## 2023-02-24 PROCEDURE — U0003 INFECTIOUS AGENT DETECTION BY NUCLEIC ACID (DNA OR RNA); SEVERE ACUTE RESPIRATORY SYNDROME CORONAVIRUS 2 (SARS-COV-2) (CORONAVIRUS DISEASE [COVID-19]), AMPLIFIED PROBE TECHNIQUE, MAKING USE OF HIGH THROUGHPUT TECHNOLOGIES AS DESCRIBED BY CMS-2020-01-R: HCPCS | Performed by: PHYSICIAN ASSISTANT

## 2023-02-24 PROCEDURE — G0378 HOSPITAL OBSERVATION PER HR: HCPCS

## 2023-02-24 PROCEDURE — 20600000 HC ROOM AND CARE INTERMEDIATE/TELEMETRY

## 2023-02-24 PROCEDURE — 83550 IRON BINDING TEST: CPT | Performed by: STUDENT IN AN ORGANIZED HEALTH CARE EDUCATION/TRAINING PROGRAM

## 2023-02-24 PROCEDURE — 85027 COMPLETE CBC AUTOMATED: CPT | Performed by: STUDENT IN AN ORGANIZED HEALTH CARE EDUCATION/TRAINING PROGRAM

## 2023-02-24 PROCEDURE — 82746 ASSAY OF FOLIC ACID SERUM: CPT | Performed by: STUDENT IN AN ORGANIZED HEALTH CARE EDUCATION/TRAINING PROGRAM

## 2023-02-24 PROCEDURE — 82728 ASSAY OF FERRITIN: CPT | Performed by: STUDENT IN AN ORGANIZED HEALTH CARE EDUCATION/TRAINING PROGRAM

## 2023-02-24 PROCEDURE — 36415 COLL VENOUS BLD VENIPUNCTURE: CPT | Performed by: INTERNAL MEDICINE

## 2023-02-24 NOTE — PLAN OF CARE
Plan of Care Review  Progress: no change  Outcome Summary: Pt aaox4. OOB ad corey, educated on fall risk. Bloody BM x 2. MD aware. Tolerating clear liquid diet. Monitoring hbg.

## 2023-02-24 NOTE — ASSESSMENT & PLAN NOTE
Hematochezia  Assessment & Plan  Mr Carrera is a 77 y.o male w/ pmh of HTN, HLD, Brito's esophagus, hemangioma of the liver, aortic stenosis, and pan-diverticulosis who presented to the ED with hematochezia. Gastroenterology has been consulted for further evaluation and management.     Impression: Patient presenting with few episodes of painless hematochezia and acute blood loss anemia. BUN:Crt ratio not elevated and no other symptoms or other suspicion for UGIB. No other antiplatelets, anticoagulants or NSAIDs. No prior hx of anemia. Etiology in setting of LGIB likely secondary to diverticular hemorrhage as last colonoscopy 2017 with pan-diverticulosis. Prior hx of LGIB back in 2011 and suspicious for previous diverticular bleed. Other etiologies include colonic/rectal Dieulafoy's lesion vs AVMs (although would not expect this degree of brisk bleeding, however at risk given hx of AS). Much less likely hemorrhagic polyp. Otherwise, HD-stable without evidence of compensatory tachycardia. Would benefit from a colonoscopy as last colonoscopy > 5 yrs for further evaluation.     Hgb 13.7 -> 11.6 -> 11.4 ; no transfusions during hospitalization.     Recommendations:     - Ensure two large bore IVs while inpatient  - Okay for CLD today  - Trend Hgb with CBC q 12 hrs, transfuse for goal Hgb > 8.0  - Suspect acute blood loss anemia, but would send anemia w/u: check iron studies, ferritin, folate and B12 for completion  - No role for IV PPI in setting of LGIB  - Will plan for colonoscopy early next week on Monday, 2/27/23, for further evaluation although suspect diverticular bleed  - Start bowel prep over weekend on Sunday  - Monitor for recurrent bloody stools while inpatient  - If patient develops HD-instability, significant drop in Hgb (> 2 grams), or large volume hematochezia would order stat CTA in attempts of localization  - Rest of care per primary team     Patient seen and examined. Plan discussed with  Gastroenterology attending, Dr. Bailey.     Maximino Colon,   Gastroenterology Fellow, PGY-V  Pager [phone]

## 2023-02-24 NOTE — ED PROVIDER NOTES
Emergency Medicine Note  HPI   HISTORY OF PRESENT ILLNESS       History provided by:  Patient  78 y/o male with PMH of colon polyps hemangioma of liver, history of Brito's esophagus, hypertension, right inguinal hernia, HLD presents to the ED with complaint of rectal bleeding that started around 6 PM tonight.  Patient states he went to the bathroom and wiped and had bright red blood on the toilet paper.  He then saw blood that was bright red in the toilet.  Upon arrival to the ER he had 1 episode of diarrhea with bright red blood.  Denies any recent history of constipation or straining.  He was hospitalized 12 years ago for bright red blood from his rectum and had endoscopy and colonoscopy that showed no source of bleeding. At that time he required blood transfusions for low blood count. His last colonoscopy was at 71 years old about 6 years ago THAT showed polyps without any other pathology.  Denies any heavy NSAID use or alcohol use.  Denies any previous history of ulcers.  Denies any history of liver disease or cirrhosis.  Denies any fever chills, chest pain, shortness of breath, abdominal pain, constipation, nausea or vomiting, dizziness or lightheadedness.      Patient History   PAST HISTORY     Reviewed from Nursing Triage:       Past Medical History:   Diagnosis Date   • Colon polyps    • Hemangioma of liver    • History of Brito's esophagus    • Hypertension    • Inguinal hernia    • Lipid disorder        Past Surgical History:   Procedure Laterality Date   • COLONOSCOPY      2016   • EYE SURGERY         Family History   Problem Relation Age of Onset   • Brito's esophagus Biological Father        Social History     Tobacco Use   • Smoking status: Never   • Smokeless tobacco: Never   Vaping Use   • Vaping Use: Never used   Substance Use Topics   • Alcohol use: Yes   • Drug use: Never         Review of Systems   REVIEW OF SYSTEMS     Review of Systems   Constitutional: Negative for chills and fever.    Respiratory: Negative for shortness of breath.    Cardiovascular: Negative for chest pain.   Gastrointestinal: Positive for hematochezia. Negative for abdominal distention, abdominal pain, constipation, nausea, rectal pain and vomiting.        Bright red blood per rectum  1 episode of bright red diarrhea   Genitourinary: Negative for difficulty urinating, dysuria, flank pain and hematuria.   Musculoskeletal: Negative for back pain.   Neurological: Negative for dizziness, syncope, weakness, light-headedness, numbness and headaches.         VITALS     ED Vitals    Date/Time Temp Pulse Resp BP SpO2 [facility]   02/23/23 2319 -- 79 16 161/83 96 % AE   02/23/23 2047 -- 75 17 143/84 96 % AE   02/23/23 1856 36.8 °C (98.3 °F) 83 16 162/87 97 % MEM        Pulse Ox %: 97 % (02/23/23 1953)  Pulse Ox Interpretation: Normal (02/23/23 1953)  Heart Rate: 83 (02/23/23 1953)        Physical Exam   PHYSICAL EXAM     Physical Exam  Vitals and nursing note reviewed.   Constitutional:       General: He is not in acute distress.     Appearance: He is not ill-appearing.   HENT:      Head: Normocephalic and atraumatic.      Mouth/Throat:      Mouth: Mucous membranes are moist.      Pharynx: Oropharynx is clear.   Eyes:      Extraocular Movements: Extraocular movements intact.   Cardiovascular:      Rate and Rhythm: Normal rate and regular rhythm.      Pulses: Normal pulses.      Heart sounds: Normal heart sounds.   Pulmonary:      Effort: Pulmonary effort is normal. No respiratory distress.      Breath sounds: Normal breath sounds.   Abdominal:      General: Abdomen is flat. Bowel sounds are normal. There is no distension.      Palpations: Abdomen is soft.      Tenderness: There is no abdominal tenderness. There is no guarding.      Comments: Right inguinal hernia chronic 20 years with no surrounding cellulitis or signs of incarcerated or strangulation   Genitourinary:     Rectum: Guaiac result positive.      Comments: No external hemorrhoids  or anal fissure  No internal hemorrhoids or mass palpated  Bright red blood on exam  Musculoskeletal:      Cervical back: Normal range of motion and neck supple.      Right lower leg: No edema.      Left lower leg: No edema.   Skin:     General: Skin is warm and dry.   Neurological:      Mental Status: He is alert and oriented to person, place, and time.      Cranial Nerves: No cranial nerve deficit.           PROCEDURES     Procedures     DATA     Results     Procedure Component Value Units Date/Time    Lipase [[id number]]  (Normal) Collected: 02/23/23 1940    Specimen: Blood, Venous Updated: 02/23/23 2051     Lipase 38 U/L     Comprehensive metabolic panel [[id number]]  (Abnormal) Collected: 02/23/23 1940    Specimen: Blood, Venous Updated: 02/23/23 2018     Sodium 138 mEQ/L      Potassium 3.9 mEQ/L      Comment: Results obtained on plasma. Plasma Potassium values may be up to 0.4 mEQ/L less than serum values. The differences may be greater for patients with high platelet or white cell counts.        Chloride 102 mEQ/L      CO2 26 mEQ/L      BUN 19 mg/dL      Creatinine 1.2 mg/dL      Glucose 116 mg/dL      Calcium 9.6 mg/dL      AST (SGOT) 31 IU/L      ALT (SGPT) 26 IU/L      Alkaline Phosphatase 80 IU/L      Total Protein 7.7 g/dL      Comment: Test performed on plasma which typically contains approximately 0.4 g/dL more protein than serum.        Albumin 4.6 g/dL      Bilirubin, Total 0.3 mg/dL      eGFR 58.9 mL/min/1.73m*2      Anion Gap 10 mEQ/L     CBC and differential [[id number]]  (Abnormal) Collected: 02/23/23 1940    Specimen: Blood, Venous Updated: 02/23/23 1953     WBC 8.93 K/uL      RBC 4.66 M/uL      Hemoglobin 13.7 g/dL      Hematocrit 39.5 %      MCV 84.8 fL      MCH 29.4 pg      MCHC 34.7 g/dL      RDW 14.0 %      Platelets 325 K/uL      MPV 9.5 fL      Differential Type Auto     nRBC 0.0 %      Immature Granulocytes 0.2 %      Neutrophils 56.2 %      Lymphocytes 30.6 %      Monocytes 10.0 %       Eosinophils 1.8 %      Basophils 1.2 %      Immature Granulocytes, Absolute 0.02 K/uL      Neutrophils, Absolute 5.02 K/uL      Lymphocytes, Absolute 2.73 K/uL      Monocytes, Absolute 0.89 K/uL      Eosinophils, Absolute 0.16 K/uL      Basophils, Absolute 0.11 K/uL     RAINBOW LT BLUE [[id number]] Collected: 02/23/23 1940    Specimen: Blood, Venous Updated: 02/23/23 1947    [facility] Draw Panel [[id number]] Collected: 02/23/23 1940    Specimen: Blood, Venous Updated: 02/23/23 1947    Narrative:      The following orders were created for panel order [place] Draw Panel.  Procedure                               Abnormality         Status                     ---------                               -----------         ------                     RAINBOW PINK[[id number]]                                     In process                 RAINBOW LT BLUE[[id number]]                                  In process                   Please view results for these tests on the individual orders.    RAINBOW PINK [[id number]] Collected: 02/23/23 1940    Specimen: Blood, Venous Updated: 02/23/23 1947          Imaging Results    None         ECG 12 lead    (Results Pending)       Scoring tools                                  ED Course & MDM   MDM / ED COURSE / CLINICAL IMPRESSION / DISPO   I reviewed the patient's external records in Epic, medication list, allergies, past medical history and performed a physical examination.    [facility]    ED Course as of 02/23/23 2338   Thu Feb 23, 2023 2010 Comprehensive metabolic panel(!)  Unremarkable [TM]   2032 CBC and differential(!)  Unremarkable [TM]   2054 Lipase: 38  Normal lipase [TM]   2117 Patient hemodynamically stable without any further episodes of bleeding from the rectum.  Labs unremarkable.  Abdomen remains soft nontender.  Plan for observation to see if patient has any more large episodes of blood while in ER with possible admission  [TM]   2237 Patient had another bowel movement was  significant bright red blood per rectum in the toilet while in ER.  Do not suspect arterial bleed.  Plan admit patient to medicine for blood count trend and to be evaluated by GI. Patient agrees with the above plan. [facility] paged [TM]   1503 Discussed case with hospitalist.  Reviewed patient's presentation, ER course, and relevant data. Hospitalist accepts patient on their service and will see / admit pt. [TM]      ED Course User Index  [TM] Garry Tran PA C     Clinical Impression      Bright red blood per rectum   Lower GI bleed     _________________     ED Disposition   Admit / Observation                   Garry Tran PA C  02/23/23 2231       Garry Tran PA C  02/23/23 225       Garry Tran PA C  02/23/23 6777       Garry Tran PA C  02/23/23 6219

## 2023-02-24 NOTE — H&P
"   Hospital Medicine Service -  History & Physical        CHIEF COMPLAINT   Rectal bleeding     HISTORY OF PRESENT ILLNESS      Quang Carrera is a 77 y.o. male with a past medical history of HLD, HTN, pre-Brito's esophagus, hemangioma of liver, aortic stenosis who presents with rectal bleeding.      States he was starting to eat dinner around 6pm, when his rectal are felt wet. He went to the bathroom and wiped and noted blood. Then sat down on the toilet and had BM that was stool mixed with bright red blood.  Due to his h/o GI bleed came to the ED immediately by 6:30p.  He had another episode around 7p while in the waiting room which was mostly bright red blood mixed with small amount of stool.  Then around 10PM had another episode. He says initially had blood clots then bright red blood. Then an hour later he went again but it was mainly gas with small amount of blood.    He says he was in his usual state of health prior to this episode. Went to play golf today..    [facility] had similar episode of rectal bleeding in Aug 2011. Then he had 2 episodes of BRBPR at home but nothing in the hospital.  He was admitted for 5 days. He required 3 PRBC. He had EGD and colonoscopy that were unremarkable.  He says he was taking Aleve at the time. He says he had constipation and restraining when he had the rectal bleeding.    Since his episode in 2011 he hasn't noted any blood in his stools until today.    He had another EGD/colonoscopy at age 71. He had colon polyps removed. He says he was told he had \"a lot of folds\" in his intestine.    Denies HA, lightheadedness, dizziness, CP, SOB, n/v, urinary symptoms, fever/chills, or cough.    Denies use of NSAIDs. Denies use of ASA or other anticoagulants.  Denies change in bowel habits. Denies weight loss.  Reports \"a little bit of gripping\" in mid lower abdomen.    He had a dental implant about 3-4 weeks. He took 3 tablets of AMOX and had diarrhea so didn't finish the " prescription    In ED, vitals are unremarkable. -160's.  Labs: Hgb 13.7, Cr 1.2, BUN 19  ---  PAST MEDICAL AND SURGICAL HISTORY      Past Medical History:   Diagnosis Date   • Aortic stenosis    • BPH (benign prostatic hyperplasia)    • Colon polyps    • COVID 11/2022   • Diverticulosis of colon    • GI bleed 2011   • Hemangioma of liver     left lobe on CT 2016   • History of Brito's esophagus    • Hypertension    • Inguinal hernia     right   • Lipid disorder    • Syncope 11/2022    outpatient [facility] -negative for arrhythmia       Past Surgical History:   Procedure Laterality Date   • COLONOSCOPY      2016   • COLONOSCOPY W/ POLYPECTOMY  08/2011    rectal polyp- hyperplastic polyp   • ESOPHAGOGASTRODUODENOSCOPY  08/2011    fundic gland polyps, antral mucosa with mild chronic inflammation, no H pylori, distal esophagus->fragments of squamous and gastric cardiac type mucosa with chronic inflammation, focal erosion with acute inflammation, very focal intestinal metaplasia, negative for dysplasia   • EYE SURGERY      detached retina       PCP: Mary Wise MD    MEDICATIONS      Prior to Admission medications    Not on any medications    ALLERGIES      Patient has no known allergies.    FAMILY HISTORY      Family History   Problem Relation Age of Onset   • Brito's esophagus Biological Father        SOCIAL HISTORY      Social History     Socioeconomic History   • Marital status:      Spouse name: None   • Number of children: None   • Years of education: None   • Highest education level: None   Tobacco Use   • Smoking status: Never   • Smokeless tobacco: Never   Vaping Use   • Vaping Use: Never used   Substance and Sexual Activity   • Alcohol use: Yes     Comment: a drink a week   • Drug use: Never   • Sexual activity: Defer     Social Determinants of Health     Food Insecurity: No Food Insecurity   • Worried About Running Out of Food in the Last Year: Never true   • Ran Out of Food in the Last Year:  Never true       REVIEW OF SYSTEMS      All other systems reviewed and negative except as noted in HPI    PHYSICAL EXAMINATION      Temp:  [36.8 °C (98.3 °F)] 36.8 °C (98.3 °F)  Heart Rate:  [75-83] 79  Resp:  [16-17] 16  BP: (143-162)/(83-87) 161/83  There is no height or weight on file to calculate BMI.    Physical Exam  Vitals and nursing note reviewed.   Constitutional:       General: He is not in acute distress.     Appearance: He is not ill-appearing or toxic-appearing.   HENT:      Head: Normocephalic and atraumatic.      Mouth/Throat:      Mouth: Mucous membranes are moist.      Pharynx: Oropharynx is clear. No oropharyngeal exudate.   Eyes:      General: No scleral icterus.     Extraocular Movements: Extraocular movements intact.      Conjunctiva/sclera: Conjunctivae normal.      Pupils: Pupils are equal, round, and reactive to light.   Cardiovascular:      Rate and Rhythm: Normal rate and regular rhythm.      Pulses: Normal pulses.      Heart sounds: Murmur (soft) heard.   Pulmonary:      Effort: No respiratory distress.      Breath sounds: No wheezing, rhonchi or rales.   Abdominal:      General: Bowel sounds are normal. There is no distension.      Palpations: Abdomen is soft.      Tenderness: There is no abdominal tenderness. There is no guarding or rebound.   Musculoskeletal:      Cervical back: Neck supple. No tenderness.      Right lower leg: No edema.      Left lower leg: No edema.   Skin:     General: Skin is warm and dry.   Neurological:      General: No focal deficit present.      Mental Status: He is alert and oriented to person, place, and time.      Cranial Nerves: No cranial nerve deficit.   Psychiatric:         Mood and Affect: Mood normal.         LABS / IMAGING / EKG        Labs  I have reviewed the patient's pertinent labs.   Labs Reviewed   CBC AND DIFF - Abnormal       Result Value    WBC 8.93      RBC 4.66      Hemoglobin 13.7      Hematocrit 39.5 (*)     MCV 84.8      MCH 29.4       "MCHC 34.7      RDW 14.0      Platelets 325      MPV 9.5      Differential Type Auto      nRBC 0.0      Immature Granulocytes 0.2      Neutrophils 56.2      Lymphocytes 30.6      Monocytes 10.0      Eosinophils 1.8      Basophils 1.2      Immature Granulocytes, Absolute 0.02      Neutrophils, Absolute 5.02      Lymphocytes, Absolute 2.73      Monocytes, Absolute 0.89      Eosinophils, Absolute 0.16      Basophils, Absolute 0.11 (*)    COMPREHENSIVE METABOLIC PANEL - Abnormal    Sodium 138      Potassium 3.9      Chloride 102      CO2 26      BUN 19      Creatinine 1.2      Glucose 116 (*)     Calcium 9.6      AST (SGOT) 31      ALT (SGPT) 26      Alkaline Phosphatase 80      Total Protein 7.7      Albumin 4.6      Bilirubin, Total 0.3      eGFR 58.9 (*)     Anion Gap 10     LIPASE - Normal    Lipase 38     RAINBOW DRAW PANEL    Narrative:     The following orders were created for panel order [facility] Draw Panel.  Procedure                               Abnormality         Status                     ---------                               -----------         ------                     RAINBOW PINK[[id number]]                                     In process                 RAINBOW LT BLUE[[id number]]                                  In process                   Please view results for these tests on the individual orders.   TYPE AND SCREEN   RAINBOW PINK   RAINBOW LT BLUE       SARS-CoV-2 (COVID-19) (no units)   Date/Time Value   11/16/2022 1107 Positive (A)       ECG/Telemetry  I have independently reviewed the ECG.  Feb 24 2023 0:36AM: Sinus rhythm with 1st AVB.76 bpm. RBBB. Qtc 461ms    ASSESSMENT AND PLAN           * Bright red blood per rectum  Assessment & Plan  - abrupt onset around 6pm 2/23- had 3 episodes with last episode 10pm   - no abd pain other than report of \" a little bit of gripping\"  - Hgb 13.7-> 11.6 in 4 hours  ( 11/16/22 Hgb 14.0)  - admitted to [facility] in 08/2011 for similar presenation -> per patient, " "required 3u PRBC and EGD and colonoscopy were unremarkable  - Repeat EGD and colonoscopy in 2016- polyp removal and told he has \"lots of folds\" in his intestine  - A CT A/P from 2016 - reported diverticulosis of sigmoid  - Likely diverticular bleed  - no use of NSAID or ASA/antiplatelets or AC    P:  - admit to tele  - cardiac monitoring  - clear liquid diet  - trend CBC qhr  - transfuse Hgb <8. Blood consent obtained.  - stat CTA A/P for brisk bleeding  - GI consult         VTE Assessment: Padua VTE Score: 4  VTE Prophylaxis: Current anticoagulants:    •None    SCD  Code Status: Full Code      Discussed advanced care planning. Quang has an ACP and Quang's surrogate decision maker is Esme (wife).  Patient Contacts    Name Relation Home Work Mobile   Esme Carrera Designated Lay Caregiver   [phone]       Estimated Discharge Date: 2/26/2023  Disposition Planning: RACHEAL Olivarez, DO  2/23/2023               "

## 2023-02-24 NOTE — PROGRESS NOTES
Hospital Medicine Service -  Daily Progress Note       SUBJECTIVE   Interval History:   Patient seen and examined at the bedside. Patient states there was bright red blood in stool this AM. Stool was left for examination and appeared as fresh blood without clotting. Patient was without any other acute complaints currently.       Denies any fever, chills, nausea, vomiting, headache, shortness of breath, chest pain,  palpitations, dysuria or polyuria,  leg swelling, or constipation.   OBJECTIVE      Vital signs in last 24 hours:  Temp:  [36.3 °C (97.4 °F)-36.8 °C (98.3 °F)] 36.4 °C (97.6 °F)  Heart Rate:  [68-96] 68  Resp:  [16-17] 16  BP: (113-162)/(67-97) 133/67    Intake/Output Summary (Last 24 hours) at 2/24/2023 1552  Last data filed at 2/24/2023 1400  Gross per 24 hour   Intake 1140 ml   Output --   Net 1140 ml       PHYSICAL EXAMINATION      Physical Exam  Constitutional:       General: He is not in acute distress.     Appearance: Normal appearance. He is normal weight. He is not ill-appearing.   HENT:      Head: Normocephalic and atraumatic.   Cardiovascular:      Rate and Rhythm: Normal rate and regular rhythm.      Pulses: Normal pulses.      Heart sounds: Normal heart sounds.   Pulmonary:      Effort: Pulmonary effort is normal.      Breath sounds: Normal breath sounds.   Abdominal:      General: Abdomen is flat. There is no distension.      Palpations: Abdomen is soft.      Tenderness: There is no abdominal tenderness. There is no guarding.   Musculoskeletal:         General: No swelling or tenderness. Normal range of motion.      Right lower leg: No edema.      Left lower leg: No edema.   Skin:     General: Skin is warm and dry.   Neurological:      Mental Status: He is alert.   Psychiatric:         Mood and Affect: Mood normal.         Behavior: Behavior normal.         Thought Content: Thought content normal.            LINES, CATHETERS, DRAINS, AIRWAYS, AND WOUNDS   Lines, Drains, and  "Airways:  Wounds (agree with documentation and present on admission):  Peripheral IV (Adult) 02/23/23 Right Antecubital (Active)   Number of days: 1         Comments: IV access      LABS / IMAGING / TELE      Labs  Results from last 7 days   Lab Units 02/24/23  0829 02/24/23  0040 02/23/23  1940   WBC K/uL 10.06 9.42 8.93   HEMOGLOBIN g/dL 11.4* 11.6* 13.7   HEMATOCRIT % 33.8* 33.2* 39.5*   PLATELETS K/uL 305 319 325         Results from last 7 days   Lab Units 02/24/23  0829 02/23/23  1940   SODIUM mEQ/L 133* 138   POTASSIUM mEQ/L 4.0 3.9   CHLORIDE mEQ/L 100 102   CO2 mEQ/L 23 26   BUN mg/dL 22* 19   CREATININE mg/dL 1.0 1.2   CALCIUM mg/dL 8.9 9.6   ALBUMIN g/dL  --  4.6   BILIRUBIN TOTAL mg/dL  --  0.3   ALK PHOS IU/L  --  80   ALT IU/L  --  26   AST IU/L  --  31   GLUCOSE mg/dL 137* 116*                      SARS-CoV-2 (COVID-19) (no units)   Date/Time Value   02/24/2023 0040 Negative       Imaging  No results found.       ASSESSMENT AND PLAN      * Bright red blood per rectum  Assessment & Plan  Likely in setting of diverticula bleed. Hx of similar presentation in 2011  Abrupt onset around 6pm 2/23- had 3 episodes with last episode 10pm   Hgb 13.7-> 11.6 in 4 hours  ( 11/16/22 Hgb 14.0)  admitted to [facility] in 08/[facility] for similar presenation -> per patient, required 3u PRBC and EGD and colonoscopy were unremarkable      Repeat EGD and colonoscopy in 2016- polyp removal and told he has \"lots of folds\" in his intestine  A CT A/P from 2016 - reported diverticulosis of sigmoid        - Cardiac monitoring  - No use of NSAID or ASA/antiplatelets or AC  - Clear liquid diet  - Trend CBC qhr  - Transfuse Hgb <8. Blood consent obtained.  - Stat CTA A/P for brisk bleeding  - GI consult, appreciate recommendations        VTE Assessment: Padua VTE Score: 4  VTE Prophylaxis:  Current anticoagulants:    •None      Code Status: Full Code      Estimated Discharge Date: 2/26/2023     Disposition Planning: Pending Further Medical " Evaluation      Graham Chang DO, PGY-1   2/24/2023

## 2023-02-24 NOTE — PLAN OF CARE
Problem: Adult Inpatient Plan of Care  Goal: Plan of Care Review  Outcome: Progressing  Flowsheets (Taken 2/24/2023 1915)  Progress: declining  Plan of Care Reviewed With: patient  Outcome Summary: new admission, pt AAOX4, afebrile, no c/o pain, up ad corey refused bed alarm and educated pt on fall risk, since arriving to the floor pt has not had any more bowel movements, clear liquid diet with no red food dye, 1 degree AV block on tele, will continue to monitor   Plan of Care Review  Plan of Care Reviewed With: patient  Progress: declining  Outcome Summary: new admission, pt AAOX4, afebrile, no c/o pain, up ad corey refused bed alarm and educated pt on fall risk, since arriving to the floor pt has not had any more bowel movements, clear liquid diet with no red food dye, 1 degree AV block on tele, will continue to monitor

## 2023-02-24 NOTE — PLAN OF CARE
Care Coordination Discharge Plan Note   Date: 2/24/2023    Time: 11:50 AM    Patient Name: Quang Carrera  Medical Record Number: [id number]  YOB: 1945  Room/BED: 0359    Discharge Assessment  Concerns to be Addressed: denies needs/concerns at this time  Anticipated Changes Related to Illness: none    Concerns Comments: Admit w/ BRBPR    Anticipated Discharge Plan  Anticipated Discharge Disposition: home without assistance or services  Patient/Family Anticipates Transition to: home with family  Patient/Family Anticipated Services at Transition: none      Patient Choice            Discharge Transportation            Discharge Barriers          Continued Care and Services - Admitted Since 2/23/2023    Coordination has not been started for this encounter.             SW met with pt and spouse Esme at bedside to confirm the above demographics. Pt confirms all the above. Pt denies Hx HC/SNF/[facility]. Pt confirms [facility] as preferred pharmacy. No dc needs identified and pt denies needs at this time. Plan for spouse to transport at discharge. SW to follow.     Dispo:  Home

## 2023-02-24 NOTE — ASSESSMENT & PLAN NOTE
"Likely in setting of diverticula bleed. Hx of similar presentation in 2011.  Significantly less blood seen in recent BM. Hemoglobin on a.m. labs stable at 10.0.    EGD showed moderate esophagitis with biopsy pending.  1 polyp was seen at the cecum which was removed without difficulty, no \"less residual blood throughout colonoscopy to suggest alternative source.      - Start Protonix 40 mg daily  - Repeat CBC monitor hemoglobin upon discharge  - S/p colonoscopy/EGD 2/27  - Follow-up with gastroenterology upon discharge    "

## 2023-02-24 NOTE — ED ATTESTATION NOTE
I personally saw and evaluated the patient, participated in the management, and agree with the findings in the note above except as where stated. The physician assistant and I discussed the case, workup, and disposition.     77-year-old male with past medical history significant for colon polyps hypertension, hyperlipidemia presenting for evaluation of rectal bleeding.  Patient reports at 6 PM last night he had an episode of bright red blood that he noted when he wiped with the toilet paper.  He then had another episode of bright red blood in the toilet.  He was concerned regarding symptoms prompting ED visit.  He reports he had a remote history of GI bleeding 12 years ago at which time had a colonoscopy endoscopy and everything was normal.  Reports his last colonoscopy was 6 years ago.    AAOx3 resting comfortably in no acute distress   Heart is regular rate and rhythm normal S1-S2  Lungs are clear to auscultation bilaterally  Abdomen nondistended with normoactive bowel sounds, soft, nontender in all quadrants    77 old male presenting for evaluation of GI bleeding.  Initial hemoglobin 13.7 which is stable from 14.0 three months ago.  Will observe in ED for possible recurrence of bleeding prior to disposition.     Zulma Nair MD  02/23/23 [id number]

## 2023-02-24 NOTE — CONSULTS
"     Gastroenterology  Consultation Note       REASON FOR CONSULT   Lower GI Bleed    Consult requested by: Dr. Olivarez   HISTORY OF PRESENT ILLNESS      Mr Carrera is a 77 y.o male w/ pmh of HTN, HLD, Brito's esophagus, hemangioma of the liver, aortic stenosis, and pan-diverticulosis who presented to the ED with hematochezia. Gastroenterology has been consulted for further evaluation and management.    Patient states he was otherwise in his USOH until last evening around dinner time he noticed feeling in his under garments. Subsequently went to the bathroom and wiped with significant blood and clots. Went to the bathroom and shortly after with significant bloody stool and bright red blood. Denies any abdominal pain, nausea/vomiting, rectal pain or other discomfort. Had another bloody BM around 10PM with large amount of blood and notes it was \"filling the toilet bowel.\" Given his ongoing bloody stools he came to the ED for further evaluation. Otherwise, no other antiplatelets or anticoagulants. No prior melena or dark stools preceding his bloody stools. No NSAIDs or significant EtOH use. Denies any chest pain, SOB, lightheadedness, or other symptoms concerning for symptomatic anemia.    In regards to his GI history, had a prior episode of rectal bleeding back 8/2011, had two episodes of BRBPR at home but nothing in the hospital.  He was admitted for 5 days. He required 3 PRBC. He had EGD and colonoscopy that were unremarkable for source of bleeding. He says he was taking Aleve at the time. He says he had constipation and restraining when he had the rectal bleeding. His last colonoscopy back on 7/2016 with scattered, pan-diverticulosis and 2 polyps (benign colonic mucosa).     In the ED, patient was afebrile, HD-stable, w/ HR 70-90s, sating well on room air. Labs notable for BUN 19 and Crt 1.2. LFTs wnl. Lipase 38. CBC w/ Hgb 13.7, WBC 8.93, and plts 325.    Patient was given IVF and admitted to medicine for " further management.    Gastroenterology has been consulted for further evaluation of suspect LGIB.    PAST MEDICAL AND SURGICAL HISTORY      PMHx:  Past Medical History:   Diagnosis Date   • Aortic stenosis    • BPH (benign prostatic hyperplasia)    • Colon polyps    • COVID 11/2022   • Diverticulosis of colon    • GI bleed 2011   • Hemangioma of liver     left lobe on CT 2016   • History of Brito's esophagus    • Hypertension    • Inguinal hernia     right   • Lipid disorder    • Syncope 11/2022    outpatient [facility] -negative for arrhythmia       PSHx:  Past Surgical History:   Procedure Laterality Date   • COLONOSCOPY      2016   • COLONOSCOPY W/ POLYPECTOMY  08/2011    rectal polyp- hyperplastic polyp   • ESOPHAGOGASTRODUODENOSCOPY  08/2011    fundic gland polyps, antral mucosa with mild chronic inflammation, no H pylori, distal esophagus->fragments of squamous and gastric cardiac type mucosa with chronic inflammation, focal erosion with acute inflammation, very focal intestinal metaplasia, negative for dysplasia   • EYE SURGERY      detached retina       PCP:   Mary Wise MD    MEDICATIONS      Prior to Admission medications    Not on File       Home medications were personally reviewed.    ALLERGIES      Patient has no known allergies.    FAMILY HISTORY      Family History   Problem Relation Age of Onset   • Brito's esophagus Biological Father        SOCIAL HISTORY      Social History     Socioeconomic History   • Marital status:      Spouse name: None   • Number of children: None   • Years of education: None   • Highest education level: None   Tobacco Use   • Smoking status: Never   • Smokeless tobacco: Never   Vaping Use   • Vaping Use: Never used   Substance and Sexual Activity   • Alcohol use: Yes     Comment: a drink a week   • Drug use: Never   • Sexual activity: Defer     Social Determinants of Health     Food Insecurity: No Food Insecurity   • Worried About Running Out of Food in the  Last Year: Never true   • Ran Out of Food in the Last Year: Never true       REVIEW OF SYSTEMS      Review of Systems  12 point ROS was otherwise negative except for those stated above per HPI    PHYSICAL EXAMINATION      Temp:  [36.3 °C (97.4 °F)-36.8 °C (98.3 °F)] 36.3 °C (97.4 °F)  Heart Rate:  [74-96] 78  Resp:  [16-17] 16  BP: (113-162)/(70-97) 113/70  Body mass index is 25.94 kg/m².    Physical Exam  General: Comfortable appearing, elderly male, in no acute distress; non-toxic appearing  Eyes: No scleral icterus; pink conjunctivae  HENT: No oropharyngeal lesions; MMM  Cardiac: RR on tele; no JVD  Lungs: Normal work of breathing and effort on room air  Abdomen: Soft, NTND; no tenderness on palpation; no guarding or rebound tenderness  Extremities: Warm and well-perfused  Skin: No jaundice  Neuro: Grossly non-focal; moves all four extremities spontaneously  Psych: Normal mood and affect; pleasant and cooperative    LABS / IMAGING / EKG        Labs  Results from last 7 days   Lab Units 02/23/23  1940   SODIUM mEQ/L 138   POTASSIUM mEQ/L 3.9   CHLORIDE mEQ/L 102   CO2 mEQ/L 26   BUN mg/dL 19   CREATININE mg/dL 1.2   CALCIUM mg/dL 9.6   ALBUMIN g/dL 4.6   BILIRUBIN TOTAL mg/dL 0.3   ALK PHOS IU/L 80   ALT IU/L 26   AST IU/L 31   GLUCOSE mg/dL 116*               Results from last 7 days   Lab Units 02/24/23  0040 02/23/23  1940   WBC K/uL 9.42 8.93   HEMOGLOBIN g/dL 11.6* 13.7   HEMATOCRIT % 33.2* 39.5*   PLATELETS K/uL 319 325         Imaging  ECG 12 lead             ASSESSMENT AND PLAN      Hematochezia  Assessment & Plan  Mr Carrera is a 77 y.o male w/ pmh of HTN, HLD, Brito's esophagus, hemangioma of the liver, aortic stenosis, and pan-diverticulosis who presented to the ED with hematochezia. Gastroenterology has been consulted for further evaluation and management.    Impression: Patient presenting with few episodes of painless hematochezia and acute blood loss anemia. BUN:Crt ratio not elevated and no other  symptoms or other suspicion for UGIB. No other antiplatelets, anticoagulants or NSAIDs. No prior hx of anemia. Etiology in setting of LGIB likely secondary to diverticular hemorrhage as last colonoscopy 2017 with pan-diverticulosis. Prior hx of LGIB back in 2011 and suspicious for previous diverticular bleed. Other etiologies include colonic/rectal Dieulafoy's lesion vs AVMs (although would not expect this degree of brisk bleeding, however at risk given hx of AS). Much less likely hemorrhagic polyp. Otherwise, HD-stable without evidence of compensatory tachycardia. Would benefit from a colonoscopy as last colonoscopy > 5 yrs for further evaluation.    Hgb 13.7 -> 11.6 -> 11.4 ; no transfusions during hospitalization.    Recommendations:    - Ensure two large bore IVs while inpatient  - Okay for CLD today  - Trend Hgb with CBC q 12 hrs, transfuse for goal Hgb > 8.0  - Suspect acute blood loss anemia, but would send anemia w/u: check iron studies, ferritin, folate and B12 for completion  - No role for IV PPI in setting of LGIB  - Will plan for colonoscopy early next week on Monday, 2/27/23, for further evaluation although suspect diverticular bleed  - Start bowel prep over weekend on Sunday  - Monitor for recurrent bloody stools while inpatient  - If patient develops HD-instability, significant drop in Hgb (> 2 grams), or large volume hematochezia would order stat CTA in attempts of localization  - Rest of care per primary team    Patient seen and examined. Plan discussed with Gastroenterology attending, Dr. Bailey.    Maximino Colon DO  Gastroenterology Fellow, PGY-V  Pager [phone]           VTE Assessment: Padua VTE Score: 4  Code Status: Full Code  Estimated discharge date: 2/26/2023

## 2023-02-25 LAB
ABO + RH BLD: NORMAL
BLD GP AB SCN SERPL QL: NEGATIVE
D AG BLD QL: POSITIVE
ERYTHROCYTE [DISTWIDTH] IN BLOOD BY AUTOMATED COUNT: 13.5 % (ref 11.6–14.4)
ERYTHROCYTE [DISTWIDTH] IN BLOOD BY AUTOMATED COUNT: 13.5 % (ref 11.6–14.4)
HCT VFR BLDCO AUTO: 28.7 % (ref 40.1–51)
HCT VFR BLDCO AUTO: 29.3 % (ref 40.1–51)
HGB BLD-MCNC: 10 G/DL (ref 13.7–17.5)
HGB BLD-MCNC: 9.7 G/DL (ref 13.7–17.5)
LABORATORY COMMENT REPORT: NORMAL
MCH RBC QN AUTO: 29 PG (ref 28–33.2)
MCH RBC QN AUTO: 29.2 PG (ref 28–33.2)
MCHC RBC AUTO-ENTMCNC: 33.8 G/DL (ref 32.2–36.5)
MCHC RBC AUTO-ENTMCNC: 34.1 G/DL (ref 32.2–36.5)
MCV RBC AUTO: 85.7 FL (ref 83–98)
MCV RBC AUTO: 85.7 FL (ref 83–98)
PDW BLD AUTO: 9.5 FL (ref 9.4–12.4)
PDW BLD AUTO: 9.6 FL (ref 9.4–12.4)
PLATELET # BLD AUTO: 264 K/UL (ref 150–350)
PLATELET # BLD AUTO: 267 K/UL (ref 150–350)
RBC # BLD AUTO: 3.35 M/UL (ref 4.5–5.8)
RBC # BLD AUTO: 3.42 M/UL (ref 4.5–5.8)
SPECIMEN EXP DATE BLD: NORMAL
WBC # BLD AUTO: 6.98 K/UL (ref 3.8–10.5)
WBC # BLD AUTO: 7.91 K/UL (ref 3.8–10.5)

## 2023-02-25 PROCEDURE — 36415 COLL VENOUS BLD VENIPUNCTURE: CPT | Performed by: STUDENT IN AN ORGANIZED HEALTH CARE EDUCATION/TRAINING PROGRAM

## 2023-02-25 PROCEDURE — 86850 RBC ANTIBODY SCREEN: CPT

## 2023-02-25 PROCEDURE — G0378 HOSPITAL OBSERVATION PER HR: HCPCS

## 2023-02-25 PROCEDURE — 86901 BLOOD TYPING SEROLOGIC RH(D): CPT

## 2023-02-25 PROCEDURE — 99232 SBSQ HOSP IP/OBS MODERATE 35: CPT | Performed by: STUDENT IN AN ORGANIZED HEALTH CARE EDUCATION/TRAINING PROGRAM

## 2023-02-25 PROCEDURE — 85027 COMPLETE CBC AUTOMATED: CPT | Performed by: STUDENT IN AN ORGANIZED HEALTH CARE EDUCATION/TRAINING PROGRAM

## 2023-02-25 PROCEDURE — 85027 COMPLETE CBC AUTOMATED: CPT

## 2023-02-25 PROCEDURE — 20600000 HC ROOM AND CARE INTERMEDIATE/TELEMETRY

## 2023-02-25 NOTE — PLAN OF CARE
Plan of Care Review  Plan of Care Reviewed With: patient  Progress: improving  Outcome Summary: HGB remains stable, patient reports dark bowel movements but not blood streaked , will ctm.

## 2023-02-25 NOTE — PROGRESS NOTES
Hospital Medicine Service -  Daily Progress Note       SUBJECTIVE   Interval History: NAEON, slept well, denies lightheadedness.  Good oral intake currently only on liquid diet.  At 7:30 AM had solid stools with blood that seeped into toilet water bowl.  Last bowel movement was yesterday at 11 AM and reports decreasing frequency of stool and decreasing quantity of blood in stool.     OBJECTIVE      Vital signs in last 24 hours:  Temp:  [36.2 °C (97.2 °F)-36.7 °C (98 °F)] 36.5 °C (97.7 °F)  Heart Rate:  [66-90] 66  Resp:  [16-18] 18  BP: (118-148)/(57-91) 142/91  No intake or output data in the 24 hours ending 02/25/23 1614    PHYSICAL EXAMINATION      Physical Exam  Constitutional:       General: He is not in acute distress.     Appearance: Normal appearance. He is not toxic-appearing.   HENT:      Head: Normocephalic.      Nose: Nose normal.   Eyes:      Extraocular Movements: Extraocular movements intact.      Pupils: Pupils are equal, round, and reactive to light.   Cardiovascular:      Rate and Rhythm: Normal rate and regular rhythm.      Pulses: Normal pulses.      Heart sounds: Normal heart sounds.   Pulmonary:      Effort: Pulmonary effort is normal. No respiratory distress.      Breath sounds: Normal breath sounds. No wheezing.   Abdominal:      General: There is no distension.      Palpations: Abdomen is soft.      Tenderness: There is no abdominal tenderness. There is no guarding.      Comments: Denies abdominal tenderness in all 4 quadrants on both light and deep palpation   Genitourinary:     Comments: Solid stool with blood surrounding likely due to suspension in toilet water bowl  Skin:     General: Skin is warm.      Capillary Refill: Capillary refill takes less than 2 seconds.   Neurological:      Mental Status: He is alert and oriented to person, place, and time. Mental status is at baseline.   Psychiatric:         Mood and Affect: Mood normal.         Behavior: Behavior normal.        LINES,  "CATHETERS, DRAINS, AIRWAYS, AND WOUNDS   Lines, Drains, and Airways:  Wounds (agree with documentation and present on admission):  Peripheral IV (Adult) 02/23/23 Right Antecubital (Active)   Number of days: 2         Comments: IV Access     LABS / IMAGING / TELE      Labs  Results from last 7 days   Lab Units 02/25/23  0924 02/24/23  1816 02/24/23  0829   WBC K/uL 6.98 7.74 10.06   HEMOGLOBIN g/dL 9.7* 10.1* 11.4*   HEMATOCRIT % 28.7* 29.6* 33.8*   PLATELETS K/uL 264 261 305         Results from last 7 days   Lab Units 02/24/23  0829 02/23/23  1940   SODIUM mEQ/L 133* 138   POTASSIUM mEQ/L 4.0 3.9   CHLORIDE mEQ/L 100 102   CO2 mEQ/L 23 26   BUN mg/dL 22* 19   CREATININE mg/dL 1.0 1.2   CALCIUM mg/dL 8.9 9.6   ALBUMIN g/dL  --  4.6   BILIRUBIN TOTAL mg/dL  --  0.3   ALK PHOS IU/L  --  80   ALT IU/L  --  26   AST IU/L  --  31   GLUCOSE mg/dL 137* 116*             SARS-CoV-2 (COVID-19) (no units)   Date/Time Value   02/24/2023 0040 Negative       Imaging  Not applicable    ECG/Telemetry  I have independently reviewed the telemetry. No events for the last 24 hours., Patient is not on telemetry.    ASSESSMENT AND PLAN      * Bright red blood per rectum  Assessment & Plan  Likely in setting of diverticula bleed. Hx of similar presentation in 2011  Abrupt onset around 6pm 2/23- had 3 episodes with last episode 10pm   Hgb 13.7-> 11.6 in 4 hours  ( 11/16/22 Hgb 14.0)  admitted to [facility] in 08/2011 for similar presenation -> per patient, required 3u PRBC and EGD and colonoscopy were unremarkable      Repeat EGD and colonoscopy in 2016- polyp removal and told he has \"lots of folds\" in his intestine  A CT A/P from 2016 - reported diverticulosis of sigmoid        - Cardiac monitoring  - No use of NSAID or ASA/antiplatelets or AC  - Clear liquid diet  - Trend CBC q 8  - Transfuse Hgb <8. Blood consent obtained.  - Stat CTA A/P for brisk bleeding  - GI consult, appreciate recommendations   - Plan for colonoscopy Monday 2/27   "     VTE Assessment: Padua VTE Score: 4  VTE Prophylaxis:  Current anticoagulants:    •None      Code Status: Full Code      Estimated Discharge Date: 2/26/2023     Disposition Planning: Further medical management needed     Khushbu Wise DO PGY-1 #[id number]  [facility] Medical Service   2/25/2023

## 2023-02-25 NOTE — PLAN OF CARE
Plan of Care Review  Plan of Care Reviewed With: patient  Progress: no change  Outcome Summary: oob independent in room. No Bm overnight. clear liquid diet continued. hgb monitored.

## 2023-02-26 LAB
ANION GAP SERPL CALC-SCNC: 9 MEQ/L (ref 3–15)
BUN SERPL-MCNC: 15 MG/DL (ref 8–20)
CALCIUM SERPL-MCNC: 8.8 MG/DL (ref 8.9–10.3)
CHLORIDE SERPL-SCNC: 97 MEQ/L (ref 98–109)
CO2 SERPL-SCNC: 24 MEQ/L (ref 22–32)
CREAT SERPL-MCNC: 1 MG/DL (ref 0.8–1.3)
ERYTHROCYTE [DISTWIDTH] IN BLOOD BY AUTOMATED COUNT: 13.3 % (ref 11.6–14.4)
ERYTHROCYTE [DISTWIDTH] IN BLOOD BY AUTOMATED COUNT: 13.3 % (ref 11.6–14.4)
ERYTHROCYTE [DISTWIDTH] IN BLOOD BY AUTOMATED COUNT: 13.4 % (ref 11.6–14.4)
GFR SERPL CREATININE-BSD FRML MDRD: >60 ML/MIN/1.73M*2
GLUCOSE SERPL-MCNC: 92 MG/DL (ref 70–99)
HCT VFR BLDCO AUTO: 28.8 % (ref 40.1–51)
HCT VFR BLDCO AUTO: 28.9 % (ref 40.1–51)
HCT VFR BLDCO AUTO: 31.4 % (ref 40.1–51)
HGB BLD-MCNC: 10 G/DL (ref 13.7–17.5)
HGB BLD-MCNC: 10 G/DL (ref 13.7–17.5)
HGB BLD-MCNC: 10.9 G/DL (ref 13.7–17.5)
MAGNESIUM SERPL-MCNC: 2.1 MG/DL (ref 1.8–2.5)
MCH RBC QN AUTO: 28.8 PG (ref 28–33.2)
MCH RBC QN AUTO: 28.9 PG (ref 28–33.2)
MCH RBC QN AUTO: 29.1 PG (ref 28–33.2)
MCHC RBC AUTO-ENTMCNC: 34.6 G/DL (ref 32.2–36.5)
MCHC RBC AUTO-ENTMCNC: 34.7 G/DL (ref 32.2–36.5)
MCHC RBC AUTO-ENTMCNC: 34.7 G/DL (ref 32.2–36.5)
MCV RBC AUTO: 83.2 FL (ref 83–98)
MCV RBC AUTO: 83.3 FL (ref 83–98)
MCV RBC AUTO: 83.7 FL (ref 83–98)
PDW BLD AUTO: 9.2 FL (ref 9.4–12.4)
PDW BLD AUTO: 9.5 FL (ref 9.4–12.4)
PDW BLD AUTO: 9.5 FL (ref 9.4–12.4)
PLATELET # BLD AUTO: 254 K/UL (ref 150–350)
PLATELET # BLD AUTO: 276 K/UL (ref 150–350)
PLATELET # BLD AUTO: 301 K/UL (ref 150–350)
POTASSIUM SERPL-SCNC: 3.8 MEQ/L (ref 3.6–5.1)
RBC # BLD AUTO: 3.46 M/UL (ref 4.5–5.8)
RBC # BLD AUTO: 3.47 M/UL (ref 4.5–5.8)
RBC # BLD AUTO: 3.75 M/UL (ref 4.5–5.8)
SODIUM SERPL-SCNC: 130 MEQ/L (ref 136–144)
WBC # BLD AUTO: 5.96 K/UL (ref 3.8–10.5)
WBC # BLD AUTO: 7 K/UL (ref 3.8–10.5)
WBC # BLD AUTO: 7.74 K/UL (ref 3.8–10.5)

## 2023-02-26 PROCEDURE — 63700000 HC SELF-ADMINISTRABLE DRUG: Performed by: STUDENT IN AN ORGANIZED HEALTH CARE EDUCATION/TRAINING PROGRAM

## 2023-02-26 PROCEDURE — 20600000 HC ROOM AND CARE INTERMEDIATE/TELEMETRY

## 2023-02-26 PROCEDURE — 36415 COLL VENOUS BLD VENIPUNCTURE: CPT

## 2023-02-26 PROCEDURE — 25800000 HC PHARMACY IV SOLUTIONS

## 2023-02-26 PROCEDURE — G0378 HOSPITAL OBSERVATION PER HR: HCPCS

## 2023-02-26 PROCEDURE — 83735 ASSAY OF MAGNESIUM: CPT

## 2023-02-26 PROCEDURE — 80048 BASIC METABOLIC PNL TOTAL CA: CPT

## 2023-02-26 PROCEDURE — 99232 SBSQ HOSP IP/OBS MODERATE 35: CPT | Performed by: STUDENT IN AN ORGANIZED HEALTH CARE EDUCATION/TRAINING PROGRAM

## 2023-02-26 PROCEDURE — 85027 COMPLETE CBC AUTOMATED: CPT | Performed by: STUDENT IN AN ORGANIZED HEALTH CARE EDUCATION/TRAINING PROGRAM

## 2023-02-26 PROCEDURE — 85027 COMPLETE CBC AUTOMATED: CPT

## 2023-02-26 RX ORDER — SODIUM CHLORIDE 9 MG/ML
INJECTION, SOLUTION INTRAVENOUS CONTINUOUS
Status: DISCONTINUED | OUTPATIENT
Start: 2023-02-26 | End: 2023-02-27

## 2023-02-26 RX ORDER — BISACODYL 5 MG
10 TABLET, DELAYED RELEASE (ENTERIC COATED) ORAL
Status: COMPLETED | OUTPATIENT
Start: 2023-02-26 | End: 2023-02-26

## 2023-02-26 RX ORDER — POLYETHYLENE GLYCOL 3350 17 G/17G
238 POWDER, FOR SOLUTION ORAL ONCE
Status: COMPLETED | OUTPATIENT
Start: 2023-02-26 | End: 2023-02-26

## 2023-02-26 RX ADMIN — BISACODYL 10 MG: 5 TABLET, COATED ORAL at 19:52

## 2023-02-26 RX ADMIN — BISACODYL 10 MG: 5 TABLET, COATED ORAL at 15:43

## 2023-02-26 RX ADMIN — SODIUM CHLORIDE: 9 INJECTION, SOLUTION INTRAVENOUS at 11:55

## 2023-02-26 RX ADMIN — SODIUM CHLORIDE: 9 INJECTION, SOLUTION INTRAVENOUS at 11:14

## 2023-02-26 RX ADMIN — POLYETHYLENE GLYCOL 3350 238 G: 17 POWDER, FOR SOLUTION ORAL at 15:43

## 2023-02-26 NOTE — PROGRESS NOTES
Hospital Medicine Service -  Daily Progress Note       SUBJECTIVE   Interval History:   No acute events overnight. Patient seen and examined at the bedside.  Patient  in good spirits this a.m. on examination.  Patient is tolerating his clear liquid diet without incident. Last BM was yesterday afternoon and with significantly less bright red blood.  Patient is without any acute complaints at this time and awaiting to start bowel prep for his colonoscopy planned for tomorrow.    Denies any fever, chills, nausea, vomiting, headache, shortness of breath, chest pain,  palpitations, dysuria or polyuria,  leg swelling, diarrhea, or constipation.   OBJECTIVE      Vital signs in last 24 hours:  Temp:  [36.4 °C (97.5 °F)-36.5 °C (97.7 °F)] 36.4 °C (97.6 °F)  Heart Rate:  [60-83] 60  Resp:  [16-18] 16  BP: ()/(65-91) 134/69    Intake/Output Summary (Last 24 hours) at 2/26/2023 1453  Last data filed at 2/26/2023 1000  Gross per 24 hour   Intake 660 ml   Output --   Net 660 ml       PHYSICAL EXAMINATION      Physical Exam  Constitutional:       General: He is not in acute distress.     Appearance: Normal appearance. He is normal weight. He is not ill-appearing.   HENT:      Head: Normocephalic and atraumatic.   Cardiovascular:      Rate and Rhythm: Normal rate and regular rhythm.      Pulses: Normal pulses.      Heart sounds: Normal heart sounds.   Pulmonary:      Effort: Pulmonary effort is normal.      Breath sounds: Normal breath sounds.   Abdominal:      General: Abdomen is flat. There is no distension.      Palpations: Abdomen is soft.      Tenderness: There is no abdominal tenderness. There is no guarding.   Musculoskeletal:         General: No swelling or tenderness. Normal range of motion.      Right lower leg: No edema.      Left lower leg: No edema.   Skin:     General: Skin is warm and dry.   Neurological:      Mental Status: He is alert.   Psychiatric:         Mood and Affect: Mood normal.         Behavior:  "Behavior normal.         Thought Content: Thought content normal.        LINES, CATHETERS, DRAINS, AIRWAYS, AND WOUNDS   Lines, Drains, and Airways:  Wounds (agree with documentation and present on admission):         Comments: IV access      LABS / IMAGING / TELE      Labs  Results from last 7 days   Lab Units 02/26/23  0811 02/26/23  0019 02/25/23  1605   WBC K/uL 5.96 7.00 7.91   HEMOGLOBIN g/dL 10.0* 10.0* 10.0*   HEMATOCRIT % 28.9* 28.8* 29.3*   PLATELETS K/uL 276 254 267         Results from last 7 days   Lab Units 02/26/23  0811 02/24/23  0829 02/23/23  1940   SODIUM mEQ/L 130* 133* 138   POTASSIUM mEQ/L 3.8 4.0 3.9   CHLORIDE mEQ/L 97* 100 102   CO2 mEQ/L 24 23 26   BUN mg/dL 15 22* 19   CREATININE mg/dL 1.0 1.0 1.2   CALCIUM mg/dL 8.8* 8.9 9.6   ALBUMIN g/dL  --   --  4.6   BILIRUBIN TOTAL mg/dL  --   --  0.3   ALK PHOS IU/L  --   --  80   ALT IU/L  --   --  26   AST IU/L  --   --  31   GLUCOSE mg/dL 92 137* 116*                      SARS-CoV-2 (COVID-19) (no units)   Date/Time Value   02/24/2023 0040 Negative       Imaging  No results found.       ASSESSMENT AND PLAN      * Bright red blood per rectum  Assessment & Plan  Likely in setting of diverticula bleed. Hx of similar presentation in 2011.  Significantly less blood seen in recent BM. Hemoglobin on a.m. labs stable at 10.0.    Repeat EGD and colonoscopy in 2016- polyp removal and told he has \"lots of folds\" in his intestine  CT A/P from 2016 - reported diverticulosis of sigmoid      - Plan for colonoscopy 2/27  - Start bowel prep at 3 PM today, n.p.o. once completed  - Clear liquid diet, n.p.o. after bowel prep  - No use of NSAID or ASA/antiplatelets or AC  - Maintain 2 large-bore IVs  - Trend CBC q12h  - Transfuse Hgb <8. Blood consent obtained.  - Stat CTA A/P for brisk bleeding  - Gastroenterology consulted, appreciate recommendations  - Plan for colonoscopy Monday 2/27       VTE Assessment: Padua VTE Score: 4  VTE Prophylaxis: SCDs placed and " maintained  Code Status: Full Code      Estimated Discharge Date: 2/27/2023     Disposition Planning: Pending Further Medical Evaluation      Graham Chang DO, PGY-1   2/26/2023

## 2023-02-26 NOTE — PROGRESS NOTES
Colonoscopy planned for 2/27 for hematochezia. Hg has remained stable over the weekend.     Clear liquid diet today and start miralax bowel prep at 3 pm (ordered). NPO once prep complete.     Candis Kenyon MD

## 2023-02-26 NOTE — PLAN OF CARE
Problem: Adult Inpatient Plan of Care  Goal: Plan of Care Review  Outcome: Progressing  Flowsheets (Taken 2/26/2023 6947)  Progress: no change  Plan of Care Reviewed With: patient  Outcome Summary: pt AAOX4, no c/o pain throughout shift, no BM overnight, CBC Q8 maintained with HGB stable, up ad lb, 1 degree AV block on tele, will cotinue to monitor   Plan of Care Review  Plan of Care Reviewed With: patient  Progress: no change  Outcome Summary: pt AAOX4, no c/o pain throughout shift, no BM overnight, CBC Q8 maintained with HGB stable, up ad lb, 1 degree AV block on tele, will cotinue to monitor

## 2023-02-27 ENCOUNTER — ANESTHESIA EVENT (INPATIENT)
Dept: ENDOSCOPY | Facility: HOSPITAL | Age: 78
DRG: 378 | End: 2023-02-27
Payer: COMMERCIAL

## 2023-02-27 ENCOUNTER — ANESTHESIA (INPATIENT)
Dept: ENDOSCOPY | Facility: HOSPITAL | Age: 78
DRG: 378 | End: 2023-02-27
Payer: COMMERCIAL

## 2023-02-27 VITALS
TEMPERATURE: 97.2 F | WEIGHT: 191.3 LBS | DIASTOLIC BLOOD PRESSURE: 64 MMHG | HEART RATE: 77 BPM | OXYGEN SATURATION: 94 % | SYSTOLIC BLOOD PRESSURE: 103 MMHG | HEIGHT: 72 IN | BODY MASS INDEX: 25.91 KG/M2 | RESPIRATION RATE: 18 BRPM

## 2023-02-27 LAB
ANION GAP SERPL CALC-SCNC: 9 MEQ/L (ref 3–15)
BUN SERPL-MCNC: 11 MG/DL (ref 8–20)
CALCIUM SERPL-MCNC: 8.7 MG/DL (ref 8.9–10.3)
CHLORIDE SERPL-SCNC: 100 MEQ/L (ref 98–109)
CO2 SERPL-SCNC: 23 MEQ/L (ref 22–32)
CREAT SERPL-MCNC: 1 MG/DL (ref 0.8–1.3)
ERYTHROCYTE [DISTWIDTH] IN BLOOD BY AUTOMATED COUNT: 13.2 % (ref 11.6–14.4)
GFR SERPL CREATININE-BSD FRML MDRD: >60 ML/MIN/1.73M*2
GLUCOSE SERPL-MCNC: 97 MG/DL (ref 70–99)
HCT VFR BLDCO AUTO: 29.8 % (ref 40.1–51)
HGB BLD-MCNC: 10.5 G/DL (ref 13.7–17.5)
MCH RBC QN AUTO: 29.2 PG (ref 28–33.2)
MCHC RBC AUTO-ENTMCNC: 35.2 G/DL (ref 32.2–36.5)
MCV RBC AUTO: 83 FL (ref 83–98)
PDW BLD AUTO: 9.6 FL (ref 9.4–12.4)
PLATELET # BLD AUTO: 301 K/UL (ref 150–350)
POTASSIUM SERPL-SCNC: 3.7 MEQ/L (ref 3.6–5.1)
RBC # BLD AUTO: 3.59 M/UL (ref 4.5–5.8)
SODIUM SERPL-SCNC: 132 MEQ/L (ref 136–144)
WBC # BLD AUTO: 8.58 K/UL (ref 3.8–10.5)

## 2023-02-27 PROCEDURE — 75000060 HC EGD BIOPSY: Performed by: INTERNAL MEDICINE

## 2023-02-27 PROCEDURE — C1773 RET DEV, INSERTABLE: HCPCS | Performed by: INTERNAL MEDICINE

## 2023-02-27 PROCEDURE — 71000011 HC PACU PHASE 1 EA ADDL MIN: Performed by: INTERNAL MEDICINE

## 2023-02-27 PROCEDURE — 0DBH8ZZ EXCISION OF CECUM, VIA NATURAL OR ARTIFICIAL OPENING ENDOSCOPIC: ICD-10-PCS | Performed by: INTERNAL MEDICINE

## 2023-02-27 PROCEDURE — 36415 COLL VENOUS BLD VENIPUNCTURE: CPT | Performed by: INTERNAL MEDICINE

## 2023-02-27 PROCEDURE — 63600000 HC DRUGS/DETAIL CODE: Mod: JW | Performed by: ANESTHESIOLOGY

## 2023-02-27 PROCEDURE — 37000001 HC ANESTHESIA GENERAL: Performed by: INTERNAL MEDICINE

## 2023-02-27 PROCEDURE — 80048 BASIC METABOLIC PNL TOTAL CA: CPT | Performed by: INTERNAL MEDICINE

## 2023-02-27 PROCEDURE — 71000001 HC PACU PHASE 1 INITIAL 30MIN: Performed by: INTERNAL MEDICINE

## 2023-02-27 PROCEDURE — 99239 HOSP IP/OBS DSCHRG MGMT >30: CPT | Performed by: STUDENT IN AN ORGANIZED HEALTH CARE EDUCATION/TRAINING PROGRAM

## 2023-02-27 PROCEDURE — 88305 TISSUE EXAM BY PATHOLOGIST: CPT | Performed by: INTERNAL MEDICINE

## 2023-02-27 PROCEDURE — 0DB68ZX EXCISION OF STOMACH, VIA NATURAL OR ARTIFICIAL OPENING ENDOSCOPIC, DIAGNOSTIC: ICD-10-PCS | Performed by: INTERNAL MEDICINE

## 2023-02-27 PROCEDURE — 75000020 HC COLONSCOPY SNARE: Performed by: INTERNAL MEDICINE

## 2023-02-27 PROCEDURE — 85027 COMPLETE CBC AUTOMATED: CPT | Performed by: STUDENT IN AN ORGANIZED HEALTH CARE EDUCATION/TRAINING PROGRAM

## 2023-02-27 PROCEDURE — G0378 HOSPITAL OBSERVATION PER HR: HCPCS

## 2023-02-27 PROCEDURE — 25000000 HC PHARMACY GENERAL: Performed by: ANESTHESIOLOGY

## 2023-02-27 PROCEDURE — 25800000 HC PHARMACY IV SOLUTIONS

## 2023-02-27 RX ORDER — PROPOFOL 10 MG/ML
INJECTION, EMULSION INTRAVENOUS CONTINUOUS PRN
Status: DISCONTINUED | OUTPATIENT
Start: 2023-02-27 | End: 2023-02-27 | Stop reason: SURG

## 2023-02-27 RX ORDER — EPHEDRINE SULFATE 50 MG/ML
INJECTION, SOLUTION INTRAVENOUS AS NEEDED
Status: DISCONTINUED | OUTPATIENT
Start: 2023-02-27 | End: 2023-02-27 | Stop reason: SURG

## 2023-02-27 RX ORDER — GLYCOPYRROLATE 0.6MG/3ML
SYRINGE (ML) INTRAVENOUS AS NEEDED
Status: DISCONTINUED | OUTPATIENT
Start: 2023-02-27 | End: 2023-02-27 | Stop reason: SURG

## 2023-02-27 RX ORDER — FENTANYL CITRATE 50 UG/ML
INJECTION, SOLUTION INTRAMUSCULAR; INTRAVENOUS AS NEEDED
Status: DISCONTINUED | OUTPATIENT
Start: 2023-02-27 | End: 2023-02-27 | Stop reason: SURG

## 2023-02-27 RX ORDER — PANTOPRAZOLE SODIUM 40 MG/1
40 TABLET, DELAYED RELEASE ORAL DAILY
Status: DISCONTINUED | OUTPATIENT
Start: 2023-02-27 | End: 2023-02-27 | Stop reason: HOSPADM

## 2023-02-27 RX ORDER — PANTOPRAZOLE SODIUM 40 MG/1
40 TABLET, DELAYED RELEASE ORAL DAILY
Qty: 30 TABLET | Refills: 0 | Status: SHIPPED | OUTPATIENT
Start: 2023-02-27 | End: 2023-12-27

## 2023-02-27 RX ORDER — PROPOFOL 10 MG/ML
INJECTION, EMULSION INTRAVENOUS AS NEEDED
Status: DISCONTINUED | OUTPATIENT
Start: 2023-02-27 | End: 2023-02-27 | Stop reason: SURG

## 2023-02-27 RX ADMIN — GLYCOPYRROLATE 0.2 MG: 0.2 INJECTION INTRAMUSCULAR; INTRAVENOUS at 11:16

## 2023-02-27 RX ADMIN — PROPOFOL 100 MG: 10 INJECTION, EMULSION INTRAVENOUS at 10:57

## 2023-02-27 RX ADMIN — PROPOFOL 100 MCG/KG/MIN: 10 INJECTION, EMULSION INTRAVENOUS at 10:57

## 2023-02-27 RX ADMIN — FENTANYL CITRATE 25 MCG: 50 INJECTION INTRAMUSCULAR; INTRAVENOUS at 10:57

## 2023-02-27 RX ADMIN — EPHEDRINE SULFATE 10 MG: 50 INJECTION, SOLUTION INTRAVENOUS at 11:16

## 2023-02-27 RX ADMIN — SODIUM CHLORIDE: 9 INJECTION, SOLUTION INTRAVENOUS at 10:52

## 2023-02-27 NOTE — ANESTHESIA PREPROCEDURE EVALUATION
Relevant Problems   CARDIOVASCULAR   (+) Aortic valve sclerosis   (+) Hypertension      GASTROINTESTINAL   (+) Bright red blood per rectum   (+) Gastroesophageal reflux disease      URINARY SYSTEM   (+) Hyponatremia      Other   (+) COVID-19 virus infection       Anesthesia ROS/MED HX    Anesthesia History - neg  Pulmonary - neg  Neuro/Psych - neg  Cardiovascular   dyslipidemia   hypertension   Covid19 Test Reviewed and ECG reviewed  Abnormal ECG  Hematological - neg  GI/Hepatic   liver disease   GERD  Musculoskeletal- neg  Renal Disease   electrolyte problem  Endo/Other- neg  ROS/MED HX Comments:    Cardiology: Syncope  Normal EF 60 % 11/2022   GI/Hepatic/Renal: GI bleed melena       Past Surgical History:   Procedure Laterality Date   • COLONOSCOPY      2016   • COLONOSCOPY W/ POLYPECTOMY  08/2011    rectal polyp- hyperplastic polyp   • ESOPHAGOGASTRODUODENOSCOPY  08/2011    fundic gland polyps, antral mucosa with mild chronic inflammation, no H pylori, distal esophagus->fragments of squamous and gastric cardiac type mucosa with chronic inflammation, focal erosion with acute inflammation, very focal intestinal metaplasia, negative for dysplasia   • EYE SURGERY      detached retina       Physical Exam    Anesthesia Plan    Plan: general    Technique: total IV anesthesia     Lines and Monitors: PIV       patient did not smoke on day of surgery   3 ASA  Blood Products:   Use of Blood Products Discussed: No   Anesthetic plan and risks discussed with: patient  Induction:   Parents Present: No  Postop Plan:   Patient Disposition: inpatient floor planned admission   Pain Management: IV analgesics  COVID negative 02/24/2023,

## 2023-02-27 NOTE — DISCHARGE SUMMARY
"   Hospital Medicine Service -  Inpatient Discharge Summary        BRIEF OVERVIEW   Admitting Provider: Shelly Olivarez DO  Attending Provider: Art Hutchinson MD    PCP: Mary Wise [phone]    Admission Date: 2/23/2023  Discharge Date: 2/27/2023     DISCHARGE DIAGNOSES      Primary Discharge Diagnosis  Bright red blood per rectum    Secondary Discharge Diagnoses  Active Hospital Problems    Diagnosis Date Noted   • Hematochezia 02/24/2023   • Bright red blood per rectum 02/23/2023      Resolved Hospital Problems   No resolved problems to display.       Problem List on Day of Discharge  * Bright red blood per rectum  Assessment & Plan  Likely in setting of diverticula bleed. Hx of similar presentation in 2011.  Significantly less blood seen in recent BM. Hemoglobin on a.m. labs stable at 10.0.    EGD showed moderate esophagitis with biopsy pending.  1 polyp was seen at the cecum which was removed without difficulty, no \"less residual blood throughout colonoscopy to suggest alternative source.      - Start Protonix 40 mg daily  - Repeat CBC monitor hemoglobin upon discharge  - S/p colonoscopy/EGD 2/27  - Follow-up with gastroenterology upon discharge      SUMMARY OF HOSPITALIZATION      Presenting Problem/History of Present Illness  Bright red blood per rectum [K62.5]  Lower GI bleed [K92.2]    This is a 77 y.o. year-old male admitted on 2/23/2023 with Bright red blood per rectum [K62.5]  Lower GI bleed [K92.2].    \"As per HPI  Quang Carrera is a 77 y.o. male with a past medical history of HLD, HTN, pre-Brito's esophagus, hemangioma of liver, aortic stenosis who presents with rectal bleeding.       States he was starting to eat dinner around 6pm, when his rectal are felt wet. He went to the bathroom and wiped and noted blood. Then sat down on the toilet and had BM that was stool mixed with bright red blood.  Due to his h/o GI bleed came to the ED immediately by 6:30p.  He had another episode around 7p while " "in the waiting room which was mostly bright red blood mixed with small amount of stool.  Then around 10PM had another episode. He says initially had blood clots then bright red blood. Then an hour later he went again but it was mainly gas with small amount of blood.     He says he was in his usual state of health prior to this episode. Went to play golf today..     States had similar episode of rectal bleeding in Aug 2011. Then he had 2 episodes of BRBPR at home but nothing in the hospital.  He was admitted for 5 days. He required 3 PRBC. He had EGD and colonoscopy that were unremarkable.  He says he was taking Aleve at the time. He says he had constipation and restraining when he had the rectal bleeding.     Since his episode in 2011 he hasn't noted any blood in his stools until today.     He had another EGD/colonoscopy at age 71. He had colon polyps removed. He says he was told he had \"a lot of folds\" in his intestine.\"    Hospital Course    #Hematochezia   Patient was admitted under the suspicion of a recurring diverticular bleed. Patient's hemoglobin drop from 13 to11 within 4 hours was concerning on admission. Vital signs were stable in the emergency department and blood consent was obtained.  Patient did not require a blood transfusion during this hospitalization.      He was admitted to telemetry and gastroenterology was consulted for further recommendations. Patient was started on Protonix 40 mg daily and he was scheduled for a Colonoscopy/EGD on the day of his discharge.  The results of his Colonoscopy/EGD on the day of discharge showed an EGD concerning for Brito's esophagitis with a biopsy pending. His colonoscopy showed 1 polyp at the cecum which was removed without difficulty.      It was suspected that his lower GI bleed was secondary to his diverticula as there was no blood and/or residual blood throughout the colonoscopy to suggest an alternative source.  Patient was advised to follow-up with " gastroenterology upon discharge for the results of his biopsy and to follow-up with his primary care doctor.  On the day of discharge patient was hemodynamically stable and vital signs were stable.  Patient was advised to continue Protonix 40 mg daily upon discharge.    Exam on Day of Discharge  Physical Exam  Constitutional:       General: He is not in acute distress.     Appearance: Normal appearance. He is normal weight. He is not ill-appearing.   HENT:      Head: Normocephalic and atraumatic.   Cardiovascular:      Rate and Rhythm: Normal rate and regular rhythm.      Pulses: Normal pulses.      Heart sounds: Normal heart sounds.   Pulmonary:      Effort: Pulmonary effort is normal.      Breath sounds: Normal breath sounds.   Abdominal:      General: Abdomen is flat. There is no distension.      Palpations: Abdomen is soft.      Tenderness: There is no abdominal tenderness. There is no guarding.   Musculoskeletal:         General: No swelling or tenderness. Normal range of motion.      Right lower leg: No edema.      Left lower leg: No edema.   Skin:     General: Skin is warm and dry.   Neurological:      Mental Status: He is alert.   Psychiatric:         Mood and Affect: Mood normal.         Behavior: Behavior normal.         Thought Content: Thought content normal.         Consults During Admission  IP CONSULT TO GASTROENTEROLOGY    DISCHARGE MEDICATIONS        Medication List      START taking these medications    pantoprazole 40 mg EC tablet  Commonly known as: PROTONIX  Take 1 tablet (40 mg total) by mouth daily.  Dose: 40 mg            Instructions for after discharge     Follow-up with Provider:      Marleni Montana MD   [phone]    [address] [facility]  [address]  [place] [id number]       Follow-up with Provider:      Mary Wise MD   [phone]    [address] [facility]  [address]  [place] [id number]       Follow-up with primary physician (PCP)      Post-Discharge Activity: Normal activity as  tolerated.      Normal activity as tolerated.    CBC      Release to patient: Immediate             PROCEDURES / LABS / IMAGING        Other Procedures  Colonoscopy/EGD    Pertinent Labs  Recent Labs   Lab 02/27/23  0446 02/26/23 2017 02/26/23  0811   WBC 8.58 7.74 5.96   HGB 10.5* 10.9* 10.0*    301 276     Results from last 7 days   Lab Units 02/27/23  0446 02/26/23  0811 02/24/23  0829 02/23/23  1940   SODIUM mEQ/L 132* 130* 133* 138   POTASSIUM mEQ/L 3.7 3.8 4.0 3.9   CHLORIDE mEQ/L 100 97* 100 102   CO2 mEQ/L 23 24 23 26   BUN mg/dL 11 15 22* 19   CREATININE mg/dL 1.0 1.0 1.0 1.2   CALCIUM mg/dL 8.7* 8.8* 8.9 9.6   ALBUMIN g/dL  --   --   --  4.6   BILIRUBIN TOTAL mg/dL  --   --   --  0.3   ALK PHOS IU/L  --   --   --  80   ALT IU/L  --   --   --  26   AST IU/L  --   --   --  31   GLUCOSE mg/dL 97 92 137* 116*     Pertinent Imaging  No results found.    OUTPATIENT  FOLLOW-UP / REFERRALS / PENDING TESTS        Outpatient Follow-Up Appointments  Encounter Information    This patient does not currently have any appointments scheduled.         Referrals  No orders of the defined types were placed in this encounter.      Test Results Pending at Discharge  Unresulted Labs (From admission, onward)     Start     Ordered    02/27/23 0000  CBC        Question:  Release to patient  Answer:  Immediate    02/27/23 1340    02/26/23 2000  CBC  Every 12 hours      Question:  Release to patient  Answer:  Immediate   Order ID Start Status   [id number] 02/27/23 2000 Needs to be Collected    02/28/23 0800 Scheduled    02/28/23 2000 Scheduled    03/01/23 0800 Scheduled    03/01/23 2000 Scheduled    03/02/23 0800 Scheduled    03/02/23 2000 Scheduled    03/03/23 0800 Scheduled    03/03/23 2000 Scheduled    03/04/23 0800 Scheduled    03/04/23 2000 Scheduled       02/26/23 1059                Important Issues to Address in Follow-Up  Please follow up with GI and your PCP once discharged     DISCHARGE DISPOSITION       Disposition: Home     Code Status At Discharge: Full Code    Physician Order for Life-Sustaining Treatment Document Status      No documents found

## 2023-02-27 NOTE — UM PHYSICIAN REVIEW NOTE
Patient Name: Quang Carrera      MRN: [id number]    A phone call will be placed to payor to request Peer to Peer phone appeal.    Cira Askew, DO  2/27/2023    Quang Carrera [birth year]  [id number]  2/23 to 2/27 2/23 - 76 yo female p/w rectal bleeding. hgb 13.7. clears. Gi consult  Last cscope 7 years ago.   2/24 - gi saw, plan for cscope. Cbc q12. Still bleeding  2/25 - still bleeding but slowed down. H/h q8. hgb down to 9.7  2/27 - cscope: difficult visualization, polyp, civerticula noted. Esophagitis. azul's disease, biopdised. dic

## 2023-02-27 NOTE — PLAN OF CARE
Plan of Care Review  Plan of Care Reviewed With: patient  Progress: no change  Outcome Summary: pt AAOX4, no c/o pain through out shift, bowel prep finished with pt stating bowel movements are currently clear, NPO at midnight for colonoscopy scheduled today, 1L NS IV fluids finished, up ad corey, NS with 1 degree AV block on tele, will continue to monitor

## 2023-02-27 NOTE — ANESTHESIA POSTPROCEDURE EVALUATION
Patient: Quang Carrera    Procedure Summary     Date: 02/27/23 Room / Location: [facility] GI 2 / [facility] GI    Anesthesia Start: 1052 Anesthesia Stop:     Procedures:       FLEXIBLE COLONOSCOPY PROXIMAL TO SPLENIC FLEXURE WITH REMOVAL OF TUMOR USING SNARE      UPPER GASTROINTESTINAL ENDOSCOPY WITH BIOPSY (Esophagus) Diagnosis:       Lower GI bleed      Hematochezia      (Lower GI bleed [K92.2])      (Hematochezia [K92.1])    Providers: Marleni Montana MD Responsible Provider: Isaura Velásquez MD    Anesthesia Type: general ASA Status: 3          Anesthesia Type: general  PACU Vitals    No data found in the last 10 encounters.           Anesthesia Post Evaluation    Pain management: adequate  Patient location during evaluation: PACU  Patient participation: complete - patient participated  Level of consciousness: awake and alert  Cardiovascular status: acceptable  Airway Patency: adequate  Respiratory status: acceptable  Hydration status: acceptable  Anesthetic complications: no

## 2023-02-27 NOTE — PLAN OF CARE
Plan of Care Review  Plan of Care Reviewed With: patient  Progress: no change  Outcome Summary: patient has not had any blood in his bowel movements today, Gatorade prep complete. Endo for scope tomorrow.

## 2023-02-27 NOTE — ANESTHESIOLOGIST PRE-PROCEDURE ATTESTATION
Pre-Procedure Patient Identification:  I am the Primary Anesthesiologist and have identified the patient on 02/27/23 at 8:55 AM.   I have confirmed the procedure(s) will be performed by the following surgeon/proceduralist Marleni Montana MD.

## 2023-02-27 NOTE — PLAN OF CARE
Care Coordination Discharge Plan Summary   Date: 2/27/2023   Time: 12:57 PM    Patient Name: Quang Carrera  Medical Record Number: [id number]  YOB: 1945  Room/BED: 0359    General Information  Patient-Specific Goals (Include Timeframe)     PCP: Mary Wise MD   Insurance Coverage: IBC  Readmission Within the last 30 days  no previous admission in last 30 days    Advance Directives (for Healthcare)?  Does patient have advance directive?: Yes  Does patient have current OOH DNR form?: No  Patient does not have current OOH DNR form: Patient/Family declines further information  Does patient have current POLST?: No  Patient does not have current POLST: Patient/Family declines further information  Does patient have mental health advance directive?: No  Patient does not have Mental Health Advance Directive: Patient/Family declines further information        Information       Living Arrangements  Arrived From: home  Current Living Arrangements: home  People in Home: spouse  Home Accessibility: stairs to enter home (Group)  Living Arrangement Comments: 2SH, 0ste, 2nd flr Bed/bath  Able to Return to Prior Arrangements: yes    Housing Stability and Financial Resources (SDOH)  In the last 12 months, was there a time when you were not able to pay the mortgage or rent on time?: No  In the last 12 months, how many places have you lived?: 1  In the last 12 months, was there a time when you did not have a steady place to sleep or slept in a shelter (including now)?: No  How hard is it for you to pay for the very basics like food, housing, medical care, and heating?: Not hard at all    Current Outpatient/Agency/Support Group       Type of Current Home Care Services       Assistive Device/Animal Currently Used at Home  none    Functional Status Comments  Independent w/ ambulation    IADL Comments  Independent w/ adl's    Employment/ Status  Employment Status: retired    Concerns to be  Addressed  no discharge needs identified    Current Discharge Risk       Anticipated Changes Related to Illness  none    Transportation Concerns (SDOH)  Transportation Concerns: car, none  Transportation Anticipated: family or friend will provide  In the past 12 months, has lack of transportation kept you from medical appointments or from getting medications?: No  In the past 12 months, has lack of transportation kept you from meetings, work, or from getting things needed for daily living?: No    Concerns Comments  Admit w/ BRBPR    Anticipated Clinical Needs       Anticipated Discharge Plan   Anticipated Discharge Disposition: home without assistance or services  Patient/Family Anticipates Transition to: home with family  Patient/Family Anticipated Services at Transition: none  Met with patient. Provided education and contact information for Care Coordination services.: yes  Screening complete: yes    Discharge Assessment  Concerns to be Addressed: no discharge needs identified  Anticipated Changes Related to Illness: none    Concerns Comments: Admit w/ BRBPR    Patient Choice            Discharge Transportation            Discharge Barriers   Barriers to Discharge: None  Comment: Per encounter with MD, plan for dc home today.  Participants: social work/services    Continued Care and Services - Admitted Since 2/23/2023    Coordination has not been started for this encounter.           SW met with pt and spouse at bedside who both deny dc needs at this time. IMM done. SW to remain available for any additional dc needs.     Dispo:  Home

## 2023-02-27 NOTE — DISCHARGE INSTRUCTIONS
1. Hospital course  You were admitted to the hospital for reason of blood in your stool and a low blood count. During your hospitalization you received intravenous fluids, a colonoscopy and daily monitoring of your red blood cell count. You did not require a blood transfusion during your hospitalization.     A colonoscopy was done and it showed 1 polyp at the cecum which was removed without any difficulty. The bleeding that you experienced is likely due to your diverticulosis.  There was no active bleeding seen during your colonoscopy. A tissue sample was obtained during your colonoscopy and you are recommended to follow up with your PCP and/or Gastroenterology upon discharge. You are also recommended to obtain a repeat blood test in approximately 1 week.     We did basic test investigations to determine the cause of this and consulted relevant specialist doctors to assist with your management.  Please follow the following instructions on the day of your discharge.      2. Medications    START taking:    Pantoprazole(Protonix) 40 mg by mouth daily      CONTINUE ALL OTHER HOME MEDICATIONS      3. Follow up    Please make an appointment with your Primary Care Provider Dr. Frandy MD. Anytime you are in the hospital you should be seen by your PCP within one to two weeks after discharge.    Please follow up with gastroenterology and your primary care physician upon discharge to follow up on the final pathology results from your biopsy.    Marleni Montana M.D.  Gastroenterology  [facility]  [address] [place] [address] [address]  [place] [address]    [address] [place] [address] [address]  [place] [address]    Phone: [phone]    How to sign up for [employer] : video and link below     [email or web address]  [email or web address]

## 2023-02-27 NOTE — PROGRESS NOTES
S/p EGD / colonoscopy this AM - EGD showing Brito's eso, bxs pending, esophagitis. Colonoscopy showing one polyp @ cecum - removed w/o difficulty, pan-diverticulosis - suspected LGIB 2/2 diverticular bleed, no old blood / residual blood throughout colonoscopy to suggest alternative source. Okay for diet, will f/u with path results. Follow hgb as outpatient, no further inpatient GI recommendations, call with ?s.

## 2023-02-27 NOTE — OP NOTE
_______________________________________________________________________________  Patient Name: Quang Carrera            Procedure Date: 2/27/2023 6:51 AM  MRN: [id number]                     Account Number: [id number]  YOB: 1945              Age: 77  Gender: Male                          Note Status: Finalized  Attending MD: KATHRYN WALTON MD~ISABELLE,  _______________________________________________________________________________  Procedure:             Colonoscopy  Indications:           Hematochezia, Acute post hemorrhagic anemia  Providers:             RED BEAULIEU (Doctor), YAJAIRA IZAGUIRRE (Fellow), Cheryl Rogers (Nurse),  Alonzo Childers RN (Nurse)  Referring MD:          SALUD ALVARADO MD  Requesting Provider:  Medicines:             See the Anesthesia note for documentation of the  administered medications  Complications:         No immediate complications.  _______________________________________________________________________________  Procedure:             After I obtained informed consent, the scope was  passed under direct vision. Throughout the procedure,  the patient's blood pressure, pulse, and oxygen  saturations were monitored continuously. The  colonoscope was introduced through the anus and  advanced to the cecum, identified by appendiceal  orifice and ileocecal valve. The colonoscopy was  performed without difficulty. The patient tolerated  the procedure well. The quality of the bowel  preparation was fair.  Estimated Blood Loss:  Estimated blood loss was minimal.  Findings:  Skin tags were found on perianal exam.  Extensive amounts of semi-liquid semi-solid stool was found in the  entire colon, making visualization difficult. Lavage of the area was  performed using copious amounts of sterile water, resulting in clearance  with fair visualization.  A 6 mm polyp was found in the appendiceal orifice. The polyp was  sessile. The polyp was  removed with a cold snare. Resection and  retrieval were complete. Estimated blood loss was minimal.  Multiple small and large-mouthed diverticula were found in the sigmoid  colon. There was narrowing of the colon in association with the  diverticular opening. There was evidence of diverticular spasm. Erythema  was seen in association with the diverticular opening. There was  evidence of multiple impacted diverticula. There was no evidence of  diverticular bleeding.  Multiple small and large-mouthed diverticula were found in the entire  colon, including the cecum.  The retroflexed view of the distal rectum and anal verge was normal and  showed no anal or rectal abnormalities.  The exam was otherwise without abnormality on direct and retroflexion  views without any large polyps, masses, or other visible AVMs.  Impression:            - Preparation of the colon was fair.  - Stool in the entire examined colon.  - One 6 mm polyp at the appendiceal orifice, removed  with a cold snare. Resected and retrieved.  - Severe diverticulosis in the entire examined colon.  No blood seen throughout the entire colon.  - The examination was otherwise normal on direct and  retroflexion views without any large polyps, masses,  or other visible AVMs.  Recommendation:        - Return patient to hospital good for ongoing care.  - Resume previous diet today.  - Continue present medications.  - Await pathology results from EGD and colonoscopy  - Repeat colonoscopy in 6 - 12 months for surveillance  as the prep was fair and small polyps may have been  missed given significant amount of stool throughout  the colon  - Consider two day bowel prep at time of next  colonoscopy.  - Return to GI clinic at appointment to be scheduled.  - Findings and recommendations discused with patient  Procedure Code(s):     --- Professional ---  74097, Colonoscopy, flexible; with removal of  tumor(s), polyp(s), or other lesion(s) by snare  technique  Diagnosis  Code(s):     --- Professional ---  K57.31, Diverticulosis of large intestine without  perforation or abscess with bleeding  D12.1, Benign neoplasm of appendix  K64.4, Residual hemorrhoidal skin tags  K92.1, Melena (includes Hematochezia)  D62, Acute posthemorrhagic anemia  CPT copyright 2021 American Medical Association. All rights reserved.  The codes documented in this report are preliminary and upon  review may  be revised to meet current compliance requirements.  Kathryn Montana MD  _____________________________________  KATHRYN MONTANA MD~ISABELLE  2/27/2023 12:05:55 PM  This report has been signed electronically.  Number of Addenda: 0  Note Initiated On: 2/27/2023 6:51 AM

## 2023-02-27 NOTE — PERIOPERATIVE NURSING NOTE
Procedure complete. Report called to RN on floor. Dr. Colon spoke with patient at bedside post procedure. Pt transferred back to unit via transport team.

## 2023-02-27 NOTE — OP NOTE
_______________________________________________________________________________  Patient Name: Quang Carrera            Procedure Date: 2/27/2023 10:46 AM  MRN: [id number]                     Account Number: [id number]  YOB: 1945              Age: 77  Gender: Male                          Note Status: Finalized  Attending MD: KATHRYN WALTON MD~ISABELLE,  _______________________________________________________________________________  Procedure:             Upper GI endoscopy  Indications:           Brito's esophagus, Surveillance for malignancy due  to personal history of Brito's esophagus  Providers:             KATHRYN WALTON MD~ISABELLE (Doctor), BUSTER AGEE DO~CYNDIE (Fellow), Cheryl Rogers (Nurse),  Alonzo Childers RN (Nurse)  Referring MD:          SALUD ALVARADO MD  Requesting Provider:  Medicines:             See the Anesthesia note for documentation of the  administered medications  Complications:         No immediate complications.  _______________________________________________________________________________  Procedure:             After obtaining informed consent, the endoscope was  passed under direct vision. Throughout the procedure,  the patient's blood pressure, pulse, and oxygen  saturations were monitored continuously. The Endoscope  was introduced through the mouth, and advanced to the  second part of duodenum. The upper GI endoscopy was  accomplished without difficulty. The patient tolerated  the procedure well.  Estimated Blood Loss:  Estimated blood loss was minimal.  Findings:  The proximal esophagus and mid esophagus were normal.  LA Grade B (one or more mucosal breaks greater than 5 mm, not extending  between the tops of two mucosal folds) esophagitis with no bleeding was  found in the distal esophagus.  The esophagus and gastroesophageal junction were examined with white  light and narrow band imaging (NBI) from a forward view and  retroflexed  position. There were esophageal mucosal changes secondary to established  short-segment Brito's disease. These changes involved the mucosa along  an irregular Z-line (40 cm from the incisors). Two tongues of  salmon-colored mucosa were present from 39 to 40 cm and no visible  abnormalities were present. The maximum longitudinal extent of these  esophageal mucosal changes was 1 cm in length. Mucosa was biopsied with  a cold forceps for histology in a targeted manner from 39 to 40 cm from  the incisors. One specimen bottle was sent to pathology. Estimated blood  loss was minimal.  A small hiatal hernia was present.  The cardia and gastric fundus were normal on retroflexion.  Multiple diminutive, benign-appearing sessile polyps were found in the  gastric fundus and in the gastric body.  The exam of the stomach was otherwise normal.  The examined duodenum was normal.  The exam was otherwise without abnormality.  Impression:            - Normal proximal esophagus and mid esophagus.  - LA Grade B reflux esophagitis with no bleeding.  - Esophageal mucosal changes secondary to established  short-segment Brito's disease. Biopsied to r/o  dysplasia.  - Small hiatal hernia.  - Multiple, benign-appearing fundic gland gastric  polyps.  - Normal examined duodenum.  - The examination was otherwise normal.  Recommendation:        - Await pathology results.  - Repeat upper endoscopy in 3 - 5 years for  surveillance of Brito's esophagus.  - Continue PPI indefinitively  - Strict avoidance of NSAIDs  - Perform a colonoscopy today for further evaluation  of hematochezia and acute blood loss anemia  Procedure Code(s):     --- Professional ---  76105, Esophagogastroduodenoscopy, flexible,  transoral; with biopsy, single or multiple  Diagnosis Code(s):     --- Professional ---  K22.70, Brito's esophagus without dysplasia  K44.9, Diaphragmatic hernia without obstruction or  gangrene  K31.7, Polyp of stomach and  duodenum  CPT copyright 2021 [employer]. All rights reserved.  The codes documented in this report are preliminary and upon  review may  be revised to meet current compliance requirements.  Kathryn Montana MD  _____________________________________  KATHRYN MONTANA MD~ISABELLE  2/27/2023 12:02:34 PM  This report has been signed electronically.  Number of Addenda: 0  Note Initiated On: 2/27/2023 10:46 AM

## 2023-02-28 ENCOUNTER — PATIENT OUTREACH (OUTPATIENT)
Dept: CASE MANAGEMENT | Facility: CLINIC | Age: 78
End: 2023-02-28
Payer: COMMERCIAL

## 2023-02-28 LAB
CASE RPRT: NORMAL
CLINICAL INFO: NORMAL
PATH REPORT.FINAL DX SPEC: NORMAL
PATH REPORT.GROSS SPEC: NORMAL

## 2023-07-24 ENCOUNTER — TELEPHONE (OUTPATIENT)
Dept: INTERNAL MEDICINE | Facility: CLINIC | Age: 78
End: 2023-07-24
Payer: COMMERCIAL

## 2023-07-24 DIAGNOSIS — R35.0 URINARY FREQUENCY: Primary | ICD-10-CM

## 2023-07-24 NOTE — TELEPHONE ENCOUNTER
Patient is having insentience issues at night and anxiety this has been going on for the last three days      [phone] is the best call back number

## 2023-07-24 NOTE — TELEPHONE ENCOUNTER
Duration 3-4 days  Pt wakes up about 4-5 times at night to void, pt has also noticed some urgency to void during the day but mostly at night.   Pt's asymptomatic otherwise  Due to the increase in voiding frequency pt states he feels more anxious.

## 2023-07-27 ENCOUNTER — TELEPHONE (OUTPATIENT)
Dept: INTERNAL MEDICINE | Facility: CLINIC | Age: 78
End: 2023-07-27
Payer: COMMERCIAL

## 2023-07-27 LAB
APPEARANCE UR: CLEAR
BACTERIA #/AREA URNS HPF: ABNORMAL /[HPF]
BACTERIA UR CULT: NORMAL
BACTERIA UR CULT: NORMAL
BILIRUB UR QL STRIP: NEGATIVE
CASTS URNS QL MICRO: ABNORMAL /LPF
COLOR UR: YELLOW
EPI CELLS #/AREA URNS HPF: ABNORMAL /HPF (ref 0–10)
GLUCOSE UR QL STRIP: NEGATIVE
HGB UR QL STRIP: NEGATIVE
KETONES UR QL STRIP: NEGATIVE
LAB CORP URINALYSIS REFLEX: ABNORMAL
LEUKOCYTE ESTERASE UR QL STRIP: ABNORMAL
MICRO URNS: ABNORMAL
NITRITE UR QL STRIP: NEGATIVE
PH UR STRIP: 5.5 [PH] (ref 5–7.5)
PROT UR QL STRIP: ABNORMAL
RBC #/AREA URNS HPF: ABNORMAL /HPF (ref 0–2)
SP GR UR STRIP: 1.02 (ref 1–1.03)
UROBILINOGEN UR STRIP-MCNC: 0.2 MG/DL (ref 0.2–1)
WBC #/AREA URNS HPF: ABNORMAL /HPF (ref 0–5)

## 2023-07-27 NOTE — TELEPHONE ENCOUNTER
Spoke with the patient and reviewed the urine culture results which did not show significant growth he tells me that he has no symptoms and the symptoms may have been secondary to electrolytes drink that he was drinking which caused him to go multiple times to the bathroom at night  he will continue to monitor and follow-up if he has recurrent symptoms

## 2023-12-27 ENCOUNTER — OFFICE VISIT (OUTPATIENT)
Dept: INTERNAL MEDICINE | Facility: CLINIC | Age: 78
End: 2023-12-27
Payer: COMMERCIAL

## 2023-12-27 VITALS
DIASTOLIC BLOOD PRESSURE: 70 MMHG | OXYGEN SATURATION: 98 % | WEIGHT: 198 LBS | HEIGHT: 72 IN | HEART RATE: 76 BPM | RESPIRATION RATE: 17 BRPM | SYSTOLIC BLOOD PRESSURE: 128 MMHG | BODY MASS INDEX: 26.82 KG/M2

## 2023-12-27 DIAGNOSIS — Z00.00 MEDICARE ANNUAL WELLNESS VISIT, SUBSEQUENT: Primary | ICD-10-CM

## 2023-12-27 DIAGNOSIS — R73.9 HYPERGLYCEMIA: ICD-10-CM

## 2023-12-27 DIAGNOSIS — E78.9 LIPID DISORDER: ICD-10-CM

## 2023-12-27 DIAGNOSIS — E55.9 VITAMIN D DEFICIENCY: ICD-10-CM

## 2023-12-27 PROCEDURE — G0009 ADMIN PNEUMOCOCCAL VACCINE: HCPCS | Performed by: INTERNAL MEDICINE

## 2023-12-27 PROCEDURE — 90677 PCV20 VACCINE IM: CPT | Performed by: INTERNAL MEDICINE

## 2023-12-27 PROCEDURE — G0439 PPPS, SUBSEQ VISIT: HCPCS | Performed by: INTERNAL MEDICINE

## 2023-12-27 ASSESSMENT — MINI COG
COMPLETED: YES
TOTAL SCORE: 5

## 2023-12-27 ASSESSMENT — PATIENT HEALTH QUESTIONNAIRE - PHQ9: SUM OF ALL RESPONSES TO PHQ9 QUESTIONS 1 & 2: 0

## 2023-12-27 NOTE — PROGRESS NOTES
Attempted to reach patient. Patient picked up then ended call, will try again later   Subjective     Quang Carrera is a 78 y.o. male who presents for a subsequent annual wellness visit.     Patient Care Team:  Mary Wise MD as PCP - Marleni Deleon MD as Consulting Physician (Gastroenterology)  Dr Martinez       Here for wellness exam and discuss health maintenance  Feels ok  Had colonoscopy by Dr Fernandez in 2/23 when he presented to the ER with rectal bleeding which showed a polyp that was benign and has diverticulosis  He does not use protonix anymore  Right inguinal hernia is asymptomatic        Comprehensive Medical and Social History  Patient Active Problem List   Diagnosis   • Acute sinusitis   • Medicare annual wellness visit, subsequent   • Hypertension   • Gastroesophageal reflux disease   • Lightheadedness   • Abdominal pain   • Hyperglycemia   • Lipid disorder   • Aortic valve sclerosis   • Inguinal hernia   • Vitamin D deficiency   • Syncope and collapse   • COVID-19 virus infection   • Leukocytosis   • Hyponatremia   • Laceration of scalp   • Bright red blood per rectum   • Hematochezia     Past Medical History:   Diagnosis Date   • Aortic stenosis    • BPH (benign prostatic hyperplasia)    • Colon polyps    • COVID 11/2022   • Diverticulosis of colon    • GI bleed 2011   • Hemangioma of liver     left lobe on CT 2016   • History of Brito's esophagus    • Hypertension    • Inguinal hernia     right   • Lipid disorder    • Syncope 11/2022    outpatient [facility] -negative for arrhythmia     Past Surgical History:   Procedure Laterality Date   • COLONOSCOPY      2016   • COLONOSCOPY W/ POLYPECTOMY  08/2011    rectal polyp- hyperplastic polyp   • ESOPHAGOGASTRODUODENOSCOPY  08/2011    fundic gland polyps, antral mucosa with mild chronic inflammation, no H pylori, distal esophagus->fragments of squamous and gastric cardiac type mucosa with chronic inflammation, focal erosion with acute inflammation, very focal intestinal metaplasia, negative for dysplasia   • EYE  SURGERY      detached retina     No Known Allergies  No current outpatient medications on file.     No current facility-administered medications for this visit.     Social History     Tobacco Use   • Smoking status: Never   • Smokeless tobacco: Never   Vaping Use   • Vaping Use: Never used   Substance Use Topics   • Alcohol use: Yes     Comment: a drink a week   • Drug use: Never     Family History   Problem Relation Age of Onset   • Brito's esophagus Biological Father        Objective   Vitals  Vitals:    12/27/23 0815   BP: 128/70   Pulse: 76   Resp: 17   SpO2: 98%   Weight: 89.8 kg (198 lb)   Height: 1.829 m (6')     Body mass index is 26.85 kg/m².    Advanced Care Plan  Does patient have advance directive?: Yes                                     PHQ  Will the patient answer the depression questions?: Yes   Little interest or pleasure in doing things: Not at all   Feeling down, depressed, or hopeless: Not at all   Depression Risk: 0                                             Mini Cog  Completed: Yes  Score: 5  Result: Negative      Get Up and Go  Result: Pass    STEADI Falls Risk  One or more falls in the last year: No           Has trouble stepping up onto a curb: No   Advised to use a cane or walker to get around safely: No   Often has to rush to the toilet: No   Feels unsteady when walking: No   Has lost some feeling in feet: No   Often feels sad or depressed: No   Steadies self on furniture while walking at home: No   Takes medication that makes him/her feel lightheaded or more tired than usual: No   Worried about falling: No   Takes medicine to sleep or improve mood: No   Needs to push with hands when rising from a chair: No   Falls screen completed: Yes     Hearing and Vision Screening  Vision Screening - Comments:: See Dr Gonzalez  See HRA for relevant hearing screening response.    Diet and Exercise  Do you exercise regularly?: Yes   Do you feel that your diet is healthy?: Yes       Assessment/Plan    Diagnoses and all orders for this visit:    Medicare annual wellness visit, subsequent (Primary)  Assessment & Plan:  Physical exam done  Health maintenance measures reveiwed  Lab slip given for fasting blood work  Current on colonoscopy in 2/23  He declined to see the urologist and also declined PSA  prevnar 20 given today  Current on Tdap,RSV  Discussed he will get the Shingles vaccine   He received 2 doses of COVID vaccine  Eye and dental exams advised  He did well on mini cog and clock test  Discussed healthy diet and regular exercise  Sun protection measures reviewed  Safety precautions reviewed      Lipid disorder  Assessment & Plan:  History of elevated lipids  Discussed diet and lifestyle changes  Labs ordered    Orders:  -     CBC and differential; Future  -     Comprehensive metabolic panel; Future  -     Lipid panel; Future  -     TSH; Future    Hyperglycemia  Assessment & Plan:  History of elevated blood sugars  Check FBS and a1c    Orders:  -     Hemoglobin A1c; Future    Vitamin D deficiency  Assessment & Plan:  History of vitamin D deficiency in the past  Labs ordered    Orders:  -     Vitamin D 25 hydroxy; Future    Other orders  -     Pneumococcal conjugate vaccine 20-valent IM      See Patient Instructions (the written plan) which was given to the patient for PPPS and health risk factors with interventions.

## 2023-12-27 NOTE — ASSESSMENT & PLAN NOTE
Physical exam done  Health maintenance measures reveiwed  Lab slip given for fasting blood work  Current on colonoscopy in 2/23  He declined to see the urologist and also declined PSA  prevnar 20 given today  Current on Tdap,RSV  Discussed he will get the Shingles vaccine   He received 2 doses of COVID vaccine  Eye and dental exams advised  He did well on mini cog and clock test  Discussed healthy diet and regular exercise  Sun protection measures reviewed  Safety precautions reviewed

## 2023-12-27 NOTE — PATIENT INSTRUCTIONS
Plan      PREVNAR 20   SHINGLES VACCINE  GET FASTING BLOOD WORK IN FEB  EYE EXAM  dR Margoth ZAVALA  SEE ME DANICA  YEAR                       Your Personalized Prevention Plan Services (PPPS)    Preventive Services Checklist (Assumes Average Risk Unless Otherwise Noted):    Health Maintenance Topics with due status: Overdue       Topic Date Due    Medicare Annual Wellness Visit Never done    Zoster Vaccine Never done    Pneumococcal (65 years and older) Never done    Falls Risk Screening Never done    Influenza Vaccine 08/01/2023    COVID-19 Vaccine 09/01/2023     Health Maintenance Topics with due status: Not Due       Topic Last Completion Date    DTaP, Tdap, and Td Vaccines 07/08/2021    Depression Screening 02/24/2023     Health Maintenance Topics with due status: Completed       Topic Last Completion Date    Hepatitis C Screening 08/25/2020     Health Maintenance Topics with due status: Aged Out       Topic Date Due    Meningococcal ACWY Aged Out    HIB Vaccines Aged Out    Hepatitis B Vaccines Aged Out    IPV Vaccines Aged Out    HPV Vaccines Aged Out     Health Maintenance Topics with due status: Discontinued       Topic Date Due    Colorectal Cancer Screening Discontinued       You May Be Eligible for These Additional Preventive Services   (Assumes Average Risk Unless Otherwise Noted)  Diabetes Screening Any 1 risk factor: hypertension, dyslipidemia, obesity, high glucose; or Any 2 risk factors: >=66yo, overweight, family history diabetes (covered every 6 months)   Hepatitis C Screening Any 1 risk factor: 1) blood transfusion before 1992,   2) current or past injection drug use (annually for high risk; if born between 3478-8225, see above for status).   Vaccine: Hepatitis B As necessary if at-risk: hemophilia, ESRD, diabetes, living with individual infected with hep B, healthcare worker with frequent contact with blood/bodily fluids (series covered once)   Sexually Transmitted Diseases (STDs) As  necessary chlamydia, gonorrhea, syphilis, hepatitis B (covered annually)  HIV if any 1 risk factor present: 1) <14yo or >66yo and at increased risk or 2) 15-66yo and ask for it (covered annually)   Lung Cancer Screening Low dose chest CT if all three risk factors: 1) 50-76yo, 2) smoker or quit within last 15y, 3) >=20 pack years (covered annually).  No results found for this or any previous visit.       Cholesterol Screening No signs or symptoms of cardiovascular disease (screening covered every 5 years).     Prostate Cancer Screening >= 49yo if patient desires test after weighting potential harms and benefits (covered annually)         Health Risk Factors with Personalized Education:  ----------------------------------------------------------------------------------------------------------------------  Controlling Your Blood Pressure  Maintain a normal weight (body mass index between 18.5 and 24.9).  Eat more fruit, vegetables and low-fat dairy.  Eat less saturated fat and total fat.  Lower your sodium (salt) intake.  Try to stay under 1500 mg per day, but if you cannot get your intake to be that low, at least lower it by 1000 mg.  Stay active.  Try to get at least 90 to 150 minutes of exercise per week.  Try brisk walking, swimming, bicycling or dancing.  Limit alcohol intake.  When you do consume alcohol, drink no more than 2 drinks per day.  If you have been prescribed medication, take it regularly and exactly as prescribed.  Let your PCP know if you have any problems or questions about your medication.  Check your blood pressure at home or at the store.  Write down your readings and share them with your PCP  ----------------------------------------------------------------------------------------------------------------------  Controlling Your Cholesterol  Reduce the amount of saturated and trans fat in your diet.  Limit intake of red meat.  Consume only low-fat or non-fat/skim dairy.  Limit fried food.  Cook  with vegetable oils.  Reduce your intake of sugary foods, sugary drinks and alcohol.  Eat a diet high in fruit, vegetables and whole grains.  Get protein from fish, poultry and a small portion of nuts.  Stay active.  Try to get at least 90 to 150 minutes of exercise per week.  Try brisk walking, swimming, bicycling or dancing.  Maintain a healthy weight by balancing your diet and exercise.  If you have been prescribed medication, take it regularly and exactly as prescribed. Let your PCP know if you have any problems or questions about your medication.  It’s important to know your cholesterol numbers.  When recommended by your PCP, get the cholesterol blood test.  ----------------------------------------------------------------------------------------------------------------------  Reducing Your Risk of Falls  Tell your PCP if any of your medications make you feel tired, dizzy, lightheaded or off-balance.  Maintain coordination, flexibility and balance by ensuring regular physical activity.  Limit alcohol intake to 1 drink per day.  Consider avoiding all alcohol intake.  Ensure good vision.  Visit an ophthalmologist or optometrist regularly for vision screening or to make sure your glasses / contact lens prescription is correct.  If you need glasses or contacts, wear them.  When you get new glasses or contacts, take time to get used to them.  Do not wear sunglasses or tinted lenses when indoors.  Ensure good hearing.  Have your hearing checked if you are having trouble hearing, or family and friends think you cannot hear them.  If you need a hearing aid, be sure it fits well and wear it.  Get enough rest.  Ensure about 7-9 hours of sleep every day.  Get up slowly from your bed or chairs.  Do not start walking until you are sure you feel steady.  Wear non-skid, rubber-soled, low-heeled shoes.  Do not walk in socks, or in shoes and slippers with smooth soles.  If your PCP or therapist recommends using a cane or walker,  use it regularly.  Make your home safer.  Increase lighting throughout the house, especially at the top and bottom of stairs.  Ensure lighting is easily turned on when getting up in the middle of the night.  Make sure there are two secure rails on all stairs.  Install grab bars in the bathtub / shower and near the toilet.  Consider using a shower chair and / or a hand-held shower.  Spread sand or salt on icy surfaces.  Beware of wet surfaces, which can be icy.  Tell your PCP if you have fallen.

## 2024-01-10 ENCOUNTER — TELEPHONE (OUTPATIENT)
Dept: INTERNAL MEDICINE | Facility: CLINIC | Age: 79
End: 2024-01-10
Payer: COMMERCIAL

## 2024-01-10 DIAGNOSIS — E78.9 LIPID DISORDER: Primary | ICD-10-CM

## 2024-01-10 LAB
25(OH)D3+25(OH)D2 SERPL-MCNC: 25.3 NG/ML (ref 30–100)
ALBUMIN SERPL-MCNC: 4.2 G/DL (ref 3.8–4.8)
ALBUMIN/GLOB SERPL: 1.4 {RATIO} (ref 1.2–2.2)
ALP SERPL-CCNC: 82 IU/L (ref 44–121)
ALT SERPL-CCNC: 19 IU/L (ref 0–44)
AST SERPL-CCNC: 22 IU/L (ref 0–40)
BASOPHILS # BLD AUTO: 0.1 X10E3/UL (ref 0–0.2)
BASOPHILS NFR BLD AUTO: 2 %
BILIRUB SERPL-MCNC: 0.4 MG/DL (ref 0–1.2)
BUN SERPL-MCNC: 18 MG/DL (ref 8–27)
BUN/CREAT SERPL: 16 (ref 10–24)
CALCIUM SERPL-MCNC: 9.4 MG/DL (ref 8.6–10.2)
CHLORIDE SERPL-SCNC: 102 MMOL/L (ref 96–106)
CHOLEST SERPL-MCNC: 208 MG/DL (ref 100–199)
CO2 SERPL-SCNC: 20 MMOL/L (ref 20–29)
CREAT SERPL-MCNC: 1.12 MG/DL (ref 0.76–1.27)
EGFRCR SERPLBLD CKD-EPI 2021: 68 ML/MIN/1.73
EOSINOPHIL # BLD AUTO: 0.2 X10E3/UL (ref 0–0.4)
EOSINOPHIL NFR BLD AUTO: 3 %
ERYTHROCYTE [DISTWIDTH] IN BLOOD BY AUTOMATED COUNT: 13.8 % (ref 11.6–15.4)
GLOBULIN SER CALC-MCNC: 3 G/DL (ref 1.5–4.5)
GLUCOSE SERPL-MCNC: 88 MG/DL (ref 70–99)
HBA1C MFR BLD: 5.6 % (ref 4.8–5.6)
HCT VFR BLD AUTO: 44 % (ref 37.5–51)
HDLC SERPL-MCNC: 34 MG/DL
HGB BLD-MCNC: 14.3 G/DL (ref 13–17.7)
IMM GRANULOCYTES # BLD AUTO: 0 X10E3/UL (ref 0–0.1)
IMM GRANULOCYTES NFR BLD AUTO: 0 %
LDLC SERPL CALC-MCNC: 118 MG/DL (ref 0–99)
LYMPHOCYTES # BLD AUTO: 2.6 X10E3/UL (ref 0.7–3.1)
LYMPHOCYTES NFR BLD AUTO: 41 %
MCH RBC QN AUTO: 28.6 PG (ref 26.6–33)
MCHC RBC AUTO-ENTMCNC: 32.5 G/DL (ref 31.5–35.7)
MCV RBC AUTO: 88 FL (ref 79–97)
MONOCYTES # BLD AUTO: 0.8 X10E3/UL (ref 0.1–0.9)
MONOCYTES NFR BLD AUTO: 12 %
NEUTROPHILS # BLD AUTO: 2.7 X10E3/UL (ref 1.4–7)
NEUTROPHILS NFR BLD AUTO: 42 %
PLATELET # BLD AUTO: 349 X10E3/UL (ref 150–450)
POTASSIUM SERPL-SCNC: 5.2 MMOL/L (ref 3.5–5.2)
PROT SERPL-MCNC: 7.2 G/DL (ref 6–8.5)
RBC # BLD AUTO: 5 X10E6/UL (ref 4.14–5.8)
SODIUM SERPL-SCNC: 139 MMOL/L (ref 134–144)
TRIGL SERPL-MCNC: 321 MG/DL (ref 0–149)
TSH SERPL DL<=0.005 MIU/L-ACNC: 1.04 UIU/ML (ref 0.45–4.5)
VLDLC SERPL CALC-MCNC: 56 MG/DL (ref 5–40)
WBC # BLD AUTO: 6.3 X10E3/UL (ref 3.4–10.8)

## 2024-01-10 NOTE — TELEPHONE ENCOUNTER
Spoke with the patient and reviewed the test results his triglycerides are very elevated discussed that he will make dietary changes and repeat the labs in 3 months  Vitamin D is mildly decreased advised him to take vitamin D supplementation 1000 units daily  The TSH is normal  Liver enzymes electrolytes and kidney function is normal  Hemoglobin is much improved no abnormal cells  Lab slip mailed

## 2025-04-10 ENCOUNTER — TELEPHONE (OUTPATIENT)
Dept: INTERNAL MEDICINE | Facility: CLINIC | Age: 80
End: 2025-04-10
Payer: COMMERCIAL

## 2025-04-10 DIAGNOSIS — R73.9 HYPERGLYCEMIA: ICD-10-CM

## 2025-04-10 DIAGNOSIS — E78.9 LIPID DISORDER: ICD-10-CM

## 2025-04-10 DIAGNOSIS — Z00.00 HEALTH CARE MAINTENANCE: Primary | ICD-10-CM

## 2025-04-10 DIAGNOSIS — E55.9 VITAMIN D DEFICIENCY: ICD-10-CM

## 2025-04-10 NOTE — TELEPHONE ENCOUNTER
"\"Yes - hell be there. Does he need any blood work or anything before appointment\"    Scheduled for June 26 th and 4 pm.    Would you like orders placed in for CBC,CMP, Lipid, A1C,TSH and Vit D?     "

## 2025-06-21 LAB
25(OH)D3+25(OH)D2 SERPL-MCNC: 48.1 NG/ML (ref 30–100)
ALBUMIN SERPL-MCNC: 4.2 G/DL (ref 3.8–4.8)
ALP SERPL-CCNC: 75 IU/L (ref 44–121)
ALT SERPL-CCNC: 22 IU/L (ref 0–44)
AST SERPL-CCNC: 29 IU/L (ref 0–40)
BASOPHILS # BLD AUTO: 0.1 X10E3/UL (ref 0–0.2)
BASOPHILS NFR BLD AUTO: 1 %
BILIRUB SERPL-MCNC: 0.5 MG/DL (ref 0–1.2)
BUN SERPL-MCNC: 21 MG/DL (ref 8–27)
BUN/CREAT SERPL: 20 (ref 10–24)
CALCIUM SERPL-MCNC: 9.4 MG/DL (ref 8.6–10.2)
CHLORIDE SERPL-SCNC: 103 MMOL/L (ref 96–106)
CHOLEST SERPL-MCNC: 201 MG/DL (ref 100–199)
CO2 SERPL-SCNC: 22 MMOL/L (ref 20–29)
CREAT SERPL-MCNC: 1.07 MG/DL (ref 0.76–1.27)
EGFRCR SERPLBLD CKD-EPI 2021: 71 ML/MIN/1.73
EOSINOPHIL # BLD AUTO: 0.2 X10E3/UL (ref 0–0.4)
EOSINOPHIL NFR BLD AUTO: 3 %
ERYTHROCYTE [DISTWIDTH] IN BLOOD BY AUTOMATED COUNT: 14 % (ref 11.6–15.4)
GLOBULIN SER CALC-MCNC: 2.8 G/DL (ref 1.5–4.5)
GLUCOSE SERPL-MCNC: 95 MG/DL (ref 70–99)
HBA1C MFR BLD: 5.7 % (ref 4.8–5.6)
HCT VFR BLD AUTO: 41.5 % (ref 37.5–51)
HDLC SERPL-MCNC: 37 MG/DL
HGB BLD-MCNC: 13.3 G/DL (ref 13–17.7)
IMM GRANULOCYTES # BLD AUTO: 0 X10E3/UL (ref 0–0.1)
IMM GRANULOCYTES NFR BLD AUTO: 0 %
LDLC SERPL CALC-MCNC: 126 MG/DL (ref 0–99)
LYMPHOCYTES # BLD AUTO: 2.5 X10E3/UL (ref 0.7–3.1)
LYMPHOCYTES NFR BLD AUTO: 38 %
MCH RBC QN AUTO: 28.7 PG (ref 26.6–33)
MCHC RBC AUTO-ENTMCNC: 32 G/DL (ref 31.5–35.7)
MCV RBC AUTO: 89 FL (ref 79–97)
MONOCYTES # BLD AUTO: 0.9 X10E3/UL (ref 0.1–0.9)
MONOCYTES NFR BLD AUTO: 14 %
NEUTROPHILS # BLD AUTO: 2.9 X10E3/UL (ref 1.4–7)
NEUTROPHILS NFR BLD AUTO: 44 %
PLATELET # BLD AUTO: 351 X10E3/UL (ref 150–450)
POTASSIUM SERPL-SCNC: 4.8 MMOL/L (ref 3.5–5.2)
PROT SERPL-MCNC: 7 G/DL (ref 6–8.5)
RBC # BLD AUTO: 4.64 X10E6/UL (ref 4.14–5.8)
SODIUM SERPL-SCNC: 139 MMOL/L (ref 134–144)
TRIGL SERPL-MCNC: 216 MG/DL (ref 0–149)
TSH SERPL DL<=0.005 MIU/L-ACNC: 0.96 UIU/ML (ref 0.45–4.5)
VLDLC SERPL CALC-MCNC: 38 MG/DL (ref 5–40)
WBC # BLD AUTO: 6.7 X10E3/UL (ref 3.4–10.8)

## 2025-06-22 ENCOUNTER — RESULTS FOLLOW-UP (OUTPATIENT)
Dept: INTERNAL MEDICINE | Facility: CLINIC | Age: 80
End: 2025-06-22

## 2025-06-26 ENCOUNTER — OFFICE VISIT (OUTPATIENT)
Dept: INTERNAL MEDICINE | Facility: CLINIC | Age: 80
End: 2025-06-26
Payer: COMMERCIAL

## 2025-06-26 VITALS
WEIGHT: 193 LBS | RESPIRATION RATE: 16 BRPM | HEART RATE: 75 BPM | BODY MASS INDEX: 26.14 KG/M2 | SYSTOLIC BLOOD PRESSURE: 142 MMHG | HEIGHT: 72 IN | DIASTOLIC BLOOD PRESSURE: 76 MMHG | OXYGEN SATURATION: 98 %

## 2025-06-26 DIAGNOSIS — E78.9 LIPID DISORDER: ICD-10-CM

## 2025-06-26 DIAGNOSIS — R73.9 HYPERGLYCEMIA: ICD-10-CM

## 2025-06-26 DIAGNOSIS — Z00.00 MEDICARE ANNUAL WELLNESS VISIT, SUBSEQUENT: Primary | ICD-10-CM

## 2025-06-26 DIAGNOSIS — C44.90 SKIN CANCER: ICD-10-CM

## 2025-06-26 PROCEDURE — G0439 PPPS, SUBSEQ VISIT: HCPCS | Performed by: INTERNAL MEDICINE

## 2025-06-26 ASSESSMENT — VISUAL ACUITY
OD_CC: 20/20
OS_CC: 20/20

## 2025-06-26 ASSESSMENT — PATIENT HEALTH QUESTIONNAIRE - PHQ9
SUM OF ALL RESPONSES TO PHQ9 QUESTIONS 1 & 2: 0
SUM OF ALL RESPONSES TO PHQ9 QUESTIONS 1 & 2: 0

## 2025-06-26 NOTE — PATIENT INSTRUCTIONS
Your Personalized Prevention Plan Services (PPPS)    Preventive Services Checklist (Assumes Average Risk Unless Otherwise Noted):    Health Maintenance Topics with due status: Overdue       Topic Date Due    Depression Screening Never done    Zoster Vaccine Never done    Falls Risk Screening Never done    Medicare Annual Wellness Visit 12/27/2024     Health Maintenance Topics with due status: Not Due       Topic Last Completion Date    DTaP, Tdap, and Td Vaccines 07/08/2021    COVID-19 Vaccine 01/13/2025     Health Maintenance Topics with due status: Completed       Topic Last Completion Date    Hepatitis C Screening 08/25/2020    RSV Vaccine 10/17/2023    Pneumococcal (50 years of age and older) 12/27/2023    Influenza Vaccine 12/14/2024     Health Maintenance Topics with due status: Aged Out       Topic Date Due    Meningococcal ACWY Aged Out    RSV <20 months Aged Out    HIB Vaccines Aged Out    Hepatitis B Vaccines Aged Out    IPV Vaccines Aged Out    Meningococcal B Aged Out    HPV Vaccines Aged Out     Health Maintenance Topics with due status: Discontinued       Topic Date Due    Colorectal Cancer Screening Discontinued       You May Be Eligible for These Additional Preventive Services   (Assumes Average Risk Unless Otherwise Noted)  Diabetes Screening Any 1 risk factor: hypertension, dyslipidemia, obesity, high glucose; or Any 2 risk factors: >=64yo, overweight, family history diabetes (covered every 6 months)   Hepatitis C Screening Any 1 risk factor: 1) blood transfusion before 1992,   2) current or past injection drug use (annually for high risk; if born between 5889-6316, see above for status).   Vaccine: Hepatitis B As necessary if at-risk: hemophilia, ESRD, diabetes, living with individual infected with hep B, healthcare worker with frequent contact with blood/bodily fluids (series covered once)   Sexually Transmitted Diseases (STDs) As necessary chlamydia, gonorrhea, syphilis,  hepatitis B (covered annually)  HIV if any 1 risk factor present: 1) <14yo or >66yo and at increased risk or 2) 15-66yo and ask for it (covered annually)   Lung Cancer Screening Low dose chest CT if all three risk factors: 1) 50-78yo, 2) smoker or quit within last 15y, 3) >=20 pack years (covered annually).  No results found for this or any previous visit.       Cholesterol Screening No signs or symptoms of cardiovascular disease (screening covered every 5 years).   Prostate Cancer Screening >= 51yo if patient desires test after weighting potential harms and benefits (covered annually)       Health Risk Factors with Personalized Education:  ----------------------------------------------------------------------------------------------------------------------  Controlling Your Blood Pressure  Maintain a normal weight (body mass index between 18.5 and 24.9).  Eat more fruit, vegetables and low-fat dairy.  Eat less saturated fat and total fat.  Lower your sodium (salt) intake.  Try to stay under 1500 mg per day, but if you cannot get your intake to be that low, at least lower it by 1000 mg.  Stay active.  Try to get at least 90 to 150 minutes of exercise per week.  Try brisk walking, swimming, bicycling or dancing.  Limit alcohol intake.  When you do consume alcohol, drink no more than 2 drinks per day.  If you have been prescribed medication, take it regularly and exactly as prescribed.  Let your PCP know if you have any problems or questions about your medication.  Check your blood pressure at home or at the store.  Write down your readings and share them with your PCP  ----------------------------------------------------------------------------------------------------------------------  Controlling Your Cholesterol  Reduce the amount of saturated and trans fat in your diet.  Limit intake of red meat.  Consume only low-fat or non-fat/skim dairy.  Limit fried food.  Cook with vegetable oils.  Reduce your intake of  sugary foods, sugary drinks and alcohol.  Eat a diet high in fruit, vegetables and whole grains.  Get protein from fish, poultry and a small portion of nuts.  Stay active.  Try to get at least 90 to 150 minutes of exercise per week.  Try brisk walking, swimming, bicycling or dancing.  Maintain a healthy weight by balancing your diet and exercise.  If you have been prescribed medication, take it regularly and exactly as prescribed. Let your PCP know if you have any problems or questions about your medication.  It’s important to know your cholesterol numbers.  When recommended by your PCP, get the cholesterol blood test.  ----------------------------------------------------------------------------------------------------------------------  Reducing Your Risk of Falls  Tell your PCP if any of your medications make you feel tired, dizzy, lightheaded or off-balance.  Maintain coordination, flexibility and balance by ensuring regular physical activity.  Limit alcohol intake to 1 drink per day.  Consider avoiding all alcohol intake.  Ensure good vision.  Visit an ophthalmologist or optometrist regularly for vision screening or to make sure your glasses / contact lens prescription is correct.  If you need glasses or contacts, wear them.  When you get new glasses or contacts, take time to get used to them.  Do not wear sunglasses or tinted lenses when indoors.  Ensure good hearing.  Have your hearing checked if you are having trouble hearing, or family and friends think you cannot hear them.  If you need a hearing aid, be sure it fits well and wear it.  Get enough rest.  Ensure about 7-9 hours of sleep every day.  Get up slowly from your bed or chairs.  Do not start walking until you are sure you feel steady.  Wear non-skid, rubber-soled, low-heeled shoes.  Do not walk in socks, or in shoes and slippers with smooth soles.  If your PCP or therapist recommends using a cane or walker, use it regularly.  Make your home safer.   Increase lighting throughout the house, especially at the top and bottom of stairs.  Ensure lighting is easily turned on when getting up in the middle of the night.  Make sure there are two secure rails on all stairs.  Install grab bars in the bathtub / shower and near the toilet.  Consider using a shower chair and / or a hand-held shower.  Spread sand or salt on icy surfaces.  Beware of wet surfaces, which can be icy.  Tell your PCP if you have fallen.      PLAN    Drink lots of fluids  Follow up with Dr Aldridge  Send a copy of the  advance directives

## 2025-06-26 NOTE — PROGRESS NOTES
Subjective     Quang Carrera is a 79 y.o. male who presents for a subsequent annual wellness visit.     Patient Care Team:  Mary Wise MD as PCP - General  Marleni Montana MD as Consulting Physician (Gastroenterology)  Dr Juan Gonzalez    Here for wellness exam and discuss health maintenance  Saw Dr Martinez recently for dizziness and had negative stress test  Symptoms resolved when he started claritin for a week  Colonoscopy was in 2/23   Had EGD also   Plays golf and is active  Noted pain in both knees and also hands  Follows with Dr Aldridge for dermatology and has history of skin cancer    Comprehensive Medical and Social History  Patient Active Problem List   Diagnosis    Acute sinusitis    Medicare annual wellness visit, subsequent    Hypertension    Gastroesophageal reflux disease    Lightheadedness    Abdominal pain    Hyperglycemia    Lipid disorder    Aortic valve sclerosis    Inguinal hernia    Vitamin D deficiency    Syncope and collapse    COVID-19 virus infection    Leukocytosis    Hyponatremia    Laceration of scalp    Bright red blood per rectum    Hematochezia    Skin cancer     Past Medical History:   Diagnosis Date    Aortic stenosis     BPH (benign prostatic hyperplasia)     Colon polyps     COVID 11/2022    Diverticulosis of colon     GI bleed 2011    Hemangioma of liver     left lobe on CT 2016    History of Brito's esophagus     Hypertension     Inguinal hernia     right    Lipid disorder     Syncope 11/2022    outpatient [facility] -negative for arrhythmia     Past Surgical History   Procedure Laterality Date    Colonoscopy      2016    Colonoscopy w/ polypectomy  08/2011    rectal polyp- hyperplastic polyp    Esophagogastroduodenoscopy  08/2011    fundic gland polyps, antral mucosa with mild chronic inflammation, no H pylori, distal esophagus->fragments of squamous and gastric cardiac type mucosa with chronic inflammation, focal erosion with acute inflammation, very focal  intestinal metaplasia, negative for dysplasia    Eye surgery      detached retina    FLEXIBLE COLONOSCOPY PROXIMAL TO SPLENIC FLEXURE WITH REMOVAL OF TUMOR USING SNARE N/A 2/27/2023    Performed by Marleni Montana MD at [facility]    UPPER GASTROINTESTINAL ENDOSCOPY WITH BIOPSY N/A 2/27/2023    Performed by Marleni Montana MD at [facility]     No Known Allergies  No current outpatient medications on file.     No current facility-administered medications for this visit.     Social History     Tobacco Use    Smoking status: Never    Smokeless tobacco: Never   Vaping Use    Vaping status: Never Used   Substance Use Topics    Alcohol use: Yes     Comment: a drink a week    Drug use: Never     Family History   Problem Relation Name Age of Onset    Brito's esophagus Biological Father         Objective   Vitals  Vitals:    06/26/25 1601   BP: (!) 142/76   BP Location: Left upper arm   Patient Position: Sitting   Pulse: 75   Resp: 16   SpO2: 98%   Weight: 87.5 kg (193 lb)   Height: 1.829 m (6')     Body mass index is 26.18 kg/m².    Advanced Care Plan  Does patient have advance directive?: Yes       Patient has Advance Directive: Advance Directive is NOT in chart, requested to bring in                             PHQ  Will the patient answer the depression questions?: Yes   Little interest or pleasure in doing things: Not at all   Feeling down, depressed, or hopeless: Not at all   Depression Risk: 0                                             Mini Cog  Completed:  (deferred)      Get Up and Go  Result: Pass    STEADI Falls Risk  One or more falls in the last year: No           Has trouble stepping up onto a curb: No   Advised to use a cane or walker to get around safely: No   Often has to rush to the toilet: No   Feels unsteady when walking: No   Has lost some feeling in feet: No   Often feels sad or depressed: No   Steadies self on furniture while walking at home: No   Takes medication that makes him/her feel  lightheaded or more tired than usual: No   Worried about falling: No   Takes medicine to sleep or improve mood: No   Needs to push with hands when rising from a chair: No   Falls screen completed: Yes     Hearing and Vision Screening  Vision Screening    Right eye Left eye Both eyes   Without correction      With correction 20/20 20/20    Hearing Screening - Comments:: Hearing eval advised  See HRA for relevant hearing screening response.    Diet and Exercise  Do you exercise regularly?: Yes   Do you feel that your diet is healthy?: Yes       Assessment/Plan   Diagnoses and all orders for this visit:    Medicare annual wellness visit, subsequent (Primary)  Assessment & Plan:  Physical exam done  Health maintenance measures reveiwed  Lab slip given for fasting blood work  Current on colonoscopy in 2/23  He declined to see the urologist and also declined PSA  Current on Flu,Tdap,RSV,pneumonia and COVID vaccine'  he also received Shingles vaccine and will send records  Eye and dental exams advised  Discussed healthy diet and regular exercise  Sun protection measures reviewed  Safety precautions reviewed  Discussed he will send a copy of the advance directive to the office       Lipid disorder  Assessment & Plan:  Reveiwed labs  Has mild elevation of chol  Triglycerides are improved  Continue to monitor        Hyperglycemia  Assessment & Plan:  Mildly elevated A1c at 5.7  Discussed he will cut out carb and sugars      Skin cancer  Assessment & Plan:  Follows with Dr Aldridge  Is scheduled to get Mohs procedure on the face  Will get records          See Patient Instructions (the written plan) which was given to the patient for PPPS and health risk factors with interventions.

## 2025-06-27 NOTE — ASSESSMENT & PLAN NOTE
Physical exam done  Health maintenance measures reveiwed  Lab slip given for fasting blood work  Current on colonoscopy in 2/23  He declined to see the urologist and also declined PSA  Current on Flu,Tdap,RSV,pneumonia and COVID vaccine'  he also received Shingles vaccine and will send records  Eye and dental exams advised  Discussed healthy diet and regular exercise  Sun protection measures reviewed  Safety precautions reviewed  Discussed he will send a copy of the advance directive to the office

## (undated) DEVICE — SNARE POLYPECTOMY 25MM

## (undated) DEVICE — CLEANSTART BEDSIDE KIT 500ML

## (undated) DEVICE — MASK O2 POM ELITE WITH 10FT TUBING

## (undated) DEVICE — MOUTHPIECE 60FR ENDO BITEBLOCK

## (undated) DEVICE — E-TRAP POLYECTOMY

## (undated) DEVICE — GOWN SURG X-LARGE MICROCOOL

## (undated) DEVICE — FORCEP COLD RADIAL JAW

## (undated) DEVICE — BIOGUARD VALVES

## (undated) DEVICE — GOWN SURG LARGE MICROCOOL